# Patient Record
Sex: MALE | Race: OTHER | HISPANIC OR LATINO | URBAN - METROPOLITAN AREA
[De-identification: names, ages, dates, MRNs, and addresses within clinical notes are randomized per-mention and may not be internally consistent; named-entity substitution may affect disease eponyms.]

---

## 2024-07-29 ENCOUNTER — INPATIENT (INPATIENT)
Facility: HOSPITAL | Age: 39
LOS: 1 days | Discharge: HOME | DRG: 69 | End: 2024-07-31
Attending: STUDENT IN AN ORGANIZED HEALTH CARE EDUCATION/TRAINING PROGRAM | Admitting: STUDENT IN AN ORGANIZED HEALTH CARE EDUCATION/TRAINING PROGRAM
Payer: MEDICAID

## 2024-07-29 VITALS
OXYGEN SATURATION: 99 % | WEIGHT: 175.27 LBS | RESPIRATION RATE: 16 BRPM | SYSTOLIC BLOOD PRESSURE: 126 MMHG | HEIGHT: 69 IN | HEART RATE: 58 BPM | TEMPERATURE: 98 F | DIASTOLIC BLOOD PRESSURE: 81 MMHG

## 2024-07-29 DIAGNOSIS — Z29.9 ENCOUNTER FOR PROPHYLACTIC MEASURES, UNSPECIFIED: ICD-10-CM

## 2024-07-29 DIAGNOSIS — Z86.73 PERSONAL HISTORY OF TRANSIENT ISCHEMIC ATTACK (TIA), AND CEREBRAL INFARCTION WITHOUT RESIDUAL DEFICITS: ICD-10-CM

## 2024-07-29 DIAGNOSIS — G45.9 TRANSIENT CEREBRAL ISCHEMIC ATTACK, UNSPECIFIED: ICD-10-CM

## 2024-07-29 DIAGNOSIS — Z85.841 PERSONAL HISTORY OF MALIGNANT NEOPLASM OF BRAIN: ICD-10-CM

## 2024-07-29 DIAGNOSIS — Z98.890 OTHER SPECIFIED POSTPROCEDURAL STATES: Chronic | ICD-10-CM

## 2024-07-29 LAB
ALBUMIN SERPL ELPH-MCNC: 3.7 G/DL — SIGNIFICANT CHANGE UP (ref 3.5–5)
ALP SERPL-CCNC: 55 U/L — SIGNIFICANT CHANGE UP (ref 40–120)
ALT FLD-CCNC: 12 U/L DA — SIGNIFICANT CHANGE UP (ref 10–60)
ANION GAP SERPL CALC-SCNC: 5 MMOL/L — SIGNIFICANT CHANGE UP (ref 5–17)
APTT BLD: 32.5 SEC — SIGNIFICANT CHANGE UP (ref 24.5–35.6)
AST SERPL-CCNC: 15 U/L — SIGNIFICANT CHANGE UP (ref 10–40)
BASOPHILS # BLD AUTO: 0.05 K/UL — SIGNIFICANT CHANGE UP (ref 0–0.2)
BASOPHILS NFR BLD AUTO: 0.6 % — SIGNIFICANT CHANGE UP (ref 0–2)
BILIRUB SERPL-MCNC: 0.6 MG/DL — SIGNIFICANT CHANGE UP (ref 0.2–1.2)
BUN SERPL-MCNC: 4 MG/DL — LOW (ref 7–18)
CALCIUM SERPL-MCNC: 8.9 MG/DL — SIGNIFICANT CHANGE UP (ref 8.4–10.5)
CHLORIDE SERPL-SCNC: 105 MMOL/L — SIGNIFICANT CHANGE UP (ref 96–108)
CO2 SERPL-SCNC: 27 MMOL/L — SIGNIFICANT CHANGE UP (ref 22–31)
CREAT SERPL-MCNC: 0.75 MG/DL — SIGNIFICANT CHANGE UP (ref 0.5–1.3)
EGFR: 117 ML/MIN/1.73M2 — SIGNIFICANT CHANGE UP
EOSINOPHIL # BLD AUTO: 0.19 K/UL — SIGNIFICANT CHANGE UP (ref 0–0.5)
EOSINOPHIL NFR BLD AUTO: 2.2 % — SIGNIFICANT CHANGE UP (ref 0–6)
GLUCOSE SERPL-MCNC: 89 MG/DL — SIGNIFICANT CHANGE UP (ref 70–99)
HCT VFR BLD CALC: 40.4 % — SIGNIFICANT CHANGE UP (ref 39–50)
HGB BLD-MCNC: 13.8 G/DL — SIGNIFICANT CHANGE UP (ref 13–17)
IMM GRANULOCYTES NFR BLD AUTO: 0.2 % — SIGNIFICANT CHANGE UP (ref 0–0.9)
INR BLD: 0.93 RATIO — SIGNIFICANT CHANGE UP (ref 0.85–1.18)
LYMPHOCYTES # BLD AUTO: 2.78 K/UL — SIGNIFICANT CHANGE UP (ref 1–3.3)
LYMPHOCYTES # BLD AUTO: 32.3 % — SIGNIFICANT CHANGE UP (ref 13–44)
MCHC RBC-ENTMCNC: 34.2 GM/DL — SIGNIFICANT CHANGE UP (ref 32–36)
MCHC RBC-ENTMCNC: 35.9 PG — HIGH (ref 27–34)
MCV RBC AUTO: 105.2 FL — HIGH (ref 80–100)
MONOCYTES # BLD AUTO: 0.8 K/UL — SIGNIFICANT CHANGE UP (ref 0–0.9)
MONOCYTES NFR BLD AUTO: 9.3 % — SIGNIFICANT CHANGE UP (ref 2–14)
NEUTROPHILS # BLD AUTO: 4.77 K/UL — SIGNIFICANT CHANGE UP (ref 1.8–7.4)
NEUTROPHILS NFR BLD AUTO: 55.4 % — SIGNIFICANT CHANGE UP (ref 43–77)
NRBC # BLD: 0 /100 WBCS — SIGNIFICANT CHANGE UP (ref 0–0)
PLATELET # BLD AUTO: 305 K/UL — SIGNIFICANT CHANGE UP (ref 150–400)
POTASSIUM SERPL-MCNC: 3.8 MMOL/L — SIGNIFICANT CHANGE UP (ref 3.5–5.3)
POTASSIUM SERPL-SCNC: 3.8 MMOL/L — SIGNIFICANT CHANGE UP (ref 3.5–5.3)
PROT SERPL-MCNC: 7.3 G/DL — SIGNIFICANT CHANGE UP (ref 6–8.3)
PROTHROM AB SERPL-ACNC: 10.6 SEC — SIGNIFICANT CHANGE UP (ref 9.5–13)
RBC # BLD: 3.84 M/UL — LOW (ref 4.2–5.8)
RBC # FLD: 13.4 % — SIGNIFICANT CHANGE UP (ref 10.3–14.5)
SODIUM SERPL-SCNC: 137 MMOL/L — SIGNIFICANT CHANGE UP (ref 135–145)
TROPONIN I, HIGH SENSITIVITY RESULT: 11.1 NG/L — SIGNIFICANT CHANGE UP
WBC # BLD: 8.61 K/UL — SIGNIFICANT CHANGE UP (ref 3.8–10.5)
WBC # FLD AUTO: 8.61 K/UL — SIGNIFICANT CHANGE UP (ref 3.8–10.5)

## 2024-07-29 PROCEDURE — 0042T: CPT | Mod: MC

## 2024-07-29 PROCEDURE — 93010 ELECTROCARDIOGRAM REPORT: CPT

## 2024-07-29 PROCEDURE — 71045 X-RAY EXAM CHEST 1 VIEW: CPT | Mod: 26

## 2024-07-29 PROCEDURE — 99223 1ST HOSP IP/OBS HIGH 75: CPT | Mod: GC

## 2024-07-29 PROCEDURE — 70498 CT ANGIOGRAPHY NECK: CPT | Mod: 26,MC

## 2024-07-29 PROCEDURE — 70496 CT ANGIOGRAPHY HEAD: CPT | Mod: 26,MC

## 2024-07-29 PROCEDURE — 99291 CRITICAL CARE FIRST HOUR: CPT

## 2024-07-29 RX ORDER — ASPIRIN 500 MG
325 TABLET ORAL ONCE
Refills: 0 | Status: COMPLETED | OUTPATIENT
Start: 2024-07-29 | End: 2024-07-29

## 2024-07-29 RX ORDER — ATORVASTATIN CALCIUM 40 MG/1
80 TABLET, FILM COATED ORAL AT BEDTIME
Refills: 0 | Status: DISCONTINUED | OUTPATIENT
Start: 2024-07-29 | End: 2024-07-31

## 2024-07-29 RX ORDER — CLOPIDOGREL BISULFATE 75 MG/1
75 TABLET, FILM COATED ORAL DAILY
Refills: 0 | Status: DISCONTINUED | OUTPATIENT
Start: 2024-07-30 | End: 2024-07-30

## 2024-07-29 RX ORDER — ENOXAPARIN SODIUM 120 MG/.8ML
40 INJECTION SUBCUTANEOUS EVERY 24 HOURS
Refills: 0 | Status: DISCONTINUED | OUTPATIENT
Start: 2024-07-29 | End: 2024-07-31

## 2024-07-29 RX ORDER — ASPIRIN 500 MG
81 TABLET ORAL DAILY
Refills: 0 | Status: DISCONTINUED | OUTPATIENT
Start: 2024-07-30 | End: 2024-07-31

## 2024-07-29 RX ADMIN — Medication 325 MILLIGRAM(S): at 17:43

## 2024-07-29 RX ADMIN — ENOXAPARIN SODIUM 40 MILLIGRAM(S): 120 INJECTION SUBCUTANEOUS at 21:46

## 2024-07-29 RX ADMIN — ATORVASTATIN CALCIUM 80 MILLIGRAM(S): 40 TABLET, FILM COATED ORAL at 21:46

## 2024-07-29 NOTE — ED PROVIDER NOTE - OBJECTIVE STATEMENT
39-year-old presenting with right-sided numbness difficulty with speech approximately 20-30 minutes prior to arrival history of brain CA no nausea vomiting trauma fall

## 2024-07-29 NOTE — H&P ADULT - ATTENDING COMMENTS
ID [id number]   39M with pmh of ?brain cancer (s/p surgery ~21y ago, with residual R sided weakness), who presented transient dysarthria and R sided weakness. He notes he had come back from an appointment regarding treatment of a keloid scar on his chest, and then came to his sister’s home around 2pm. She noticed that he sat down to eat and during that time, it was noticed he had R sided arm weakness/drooping that he was unable to lift the arm on its own. Note at baseline he can lift the arm on his own against gravity. She also noticed he was slurring his words and stopping between words. All this happened for about 45 minutes before coming back to his baseline. She also had take his BP during this time, she notes his pressure was 156/72,  at that time. No associated vision changes, dizziness, lightheadedness, dyspnea, chest pain. No recent illness, viral symptoms, sick contacts. Denies any other pmh, not on any medications, but notes Fhx of HTN in parents. He denies hx of smoking, etoh, drug use. Now both the patient and his sister at bedside note that he is back to his baseline. Never had a prior similar episode before.      Vitals, labs and imaging reviewed. On arrival noted afebrile, HR 58, /81, 99% room air. WBC wnl, coags wnl, bmp wnl. Trop negative. CXR – no acute findings.    CTH - There is mild diffuse parenchymal volume loss. There is no acute intracranial hemorrhage, or midline shift. There is no acute territorial infarct. There is no hydrocephalus. Partial empty sella. Left frontal alex hole noted. Encephalomalacia/gliosis along the prior ventricular tract noted. Nonspecific hypodensity noted at the region of left medial thalamus/left lateral third ventricle. Retention cyst/polyp right maxillary sinus   CTA H/N -The common carotid and internal carotid arteries are patent. The extracranial vertebral arteries are patent. The proximal anterior, middle and posterior cerebral arteries are patent. The intracranial vertebral and basilar arteries are patent. There is no intracranial aneurysm.   PE – NAD, AOx3, no dysarthria, no facial asymmetry noted, RRR, Lungs CTA b/l, abd soft ntnd, nl bowel sounds, no LE edema, some dysmetria noted on R sided FNF test. Strabismus noted on L eye. No drift noted. RUE strength noted 4/5 compared to 5/5 on LUE. B/l LE noted symmetric in strength and sensations        A/P   39M with pmh of ?brain cancer (s/p surgery ~21y ago, with residual R sided weakness), who presented transient dysarthria and R sided weakness, admitted for TIA workup.   #Transient R sided weakness   #Transient dysarthria   #TIA w/u   #Hx brain surgery w residual R sided weakness      -start on asa/plavix/statin   -neuro consult   -discuss with neuro re MRI   -neuro checks q4   -monitor tele for events   -TTE with bubble   -tsh, a1c, lipid panel   -bedside dysphagia screen   -PT eval   -fall precautions  -dvt ppx

## 2024-07-29 NOTE — H&P ADULT - ASSESSMENT
38 yo M with PMH of  Brain CA  s/p resection 21 yrs ago with residual RT sided weakness, present with RT sided weakness and slurred speech ( lasted 45 mins)  which started today at approx 3pm. Admitted to Telemetry for TIA

## 2024-07-29 NOTE — H&P ADULT - NSHPPHYSICALEXAM_GEN_ALL_CORE
GENERAL: NAD, lying in bed comfortably   HEAD:  Atraumatic, Normocephalic  EYES: clear conjunctiva and  sclera   ENT: Moist mucous membranes  NECK: Supple  LUNG: Clear to auscultation bilaterally. No rales, wheezing,   HEART: Regular rate and rhythm; No murmurs,   ABDOMEN: Bowel sounds present; Soft, Nontender, Nondistended, Negative Rovsing's sign   EXTREMITIES:  2+ Peripheral Pulses, . No clubbing, cyanosis, or edema  NERVOUS SYSTEM:  AAOX3, speech clear. CN ll - Xll intact , 4/5 strength in RUE, 5/5 strength in RLE, 5/5 Strength in LUE & LLE, sensation intact, No pronator drift, finger to nose test intact   SKIN: No rashes or lesions

## 2024-07-29 NOTE — H&P ADULT - PROBLEM SELECTOR PLAN 1
p/w RT sided weakness and slurred speech that started today approx at 3pm  patient returned back to baseline while in the ED  NIHSS  0 in the ED   CXR: neg   Stroke protocol: negative for hemorrhage, stenosis, midline shift. Left frontal alex hole noted. Encephalomalacia/gliosis along the prior ventricular tract noted.  Passed dysphagia screen   - Started Aspirin 81mg, Atorvastatin 80mg QD, Clopidogrel 75mg QD  - Tele monitoring  - Vitals Q4hrs   - Neuro checks q4  - Fall risk precaution  - Aspiration risk   - Monitor BP - allow permissive htn x 24hr ( BP Goal:  <180/110)  - PT consulted   - Neuro consulted:  - f/u Lipid panel, A1c   - f/u MRI p/w RT sided weakness and slurred speech that started today approx at 3pm  patient returned back to baseline while in the ED  NIHSS  0 in the ED   CXR: neg   Stroke protocol: negative for hemorrhage, stenosis, midline shift. Left frontal alex hole noted. Encephalomalacia/gliosis along the prior ventricular tract noted.  Passed dysphagia screen   - Started Aspirin 81mg, Atorvastatin 80mg QD, Clopidogrel 75mg QD  - Tele monitoring  - Vitals Q4hrs   - Neuro checks q4  - Fall risk precaution  - Aspiration risk   - Monitor BP - allow permissive htn x 24hr ( BP Goal:  <180/110)  - PT consulted   - Neuro consulted: Dr. Reyes   - f/u Lipid panel, A1c   - f/u MRI p/w RT sided weakness and slurred speech that started today approx at 3pm  patient returned back to baseline while in the ED  NIHSS  0 in the ED   CXR: neg   Stroke protocol: negative for hemorrhage, stenosis, midline shift. Left frontal alex hole noted. Encephalomalacia/gliosis along the prior ventricular tract noted.  Passed dysphagia screen   - Started Aspirin 81mg, Atorvastatin 80mg QD, Clopidogrel 75mg QD  - Tele monitoring  - Vitals Q4hrs   - Neuro checks q4  - Fall risk precaution  - Aspiration risk   - Monitor BP - allow permissive htn x 24hr ( BP Goal:  <180/110)  - PT consulted   - Neuro consulted: Dr. Reyes   - f/u Lipid panel, A1c   - f/u MRI  - f/u TTE with bubble study

## 2024-07-29 NOTE — PATIENT PROFILE ADULT - FALL HARM RISK - HARM RISK INTERVENTIONS

## 2024-07-29 NOTE — ED ADULT NURSE NOTE - NS ED NOTE ABUSE SUSPICION NEGLECT YN
Impression: Other hereditary corneal dystrophies ; OU Plan: Discussed diagnosis with patient. 
Use AT's QID OU or more
ctm No

## 2024-07-29 NOTE — ED PROVIDER NOTE - CLINICAL SUMMARY MEDICAL DECISION MAKING FREE TEXT BOX
patient presenting for speech difficulty and right sided numbness from his prior to arrival on arrival  stroke scale 0 speech normal only noting right sided baseline deficit but otherwise able to move extremity no sensation  abnormality clinically well CT head negative for acute stroke will admit for TIA workup

## 2024-07-30 PROCEDURE — 70552 MRI BRAIN STEM W/DYE: CPT | Mod: 26

## 2024-07-30 PROCEDURE — 99233 SBSQ HOSP IP/OBS HIGH 50: CPT | Mod: GC

## 2024-07-30 PROCEDURE — 99222 1ST HOSP IP/OBS MODERATE 55: CPT

## 2024-07-30 RX ORDER — CLOPIDOGREL BISULFATE 75 MG/1
300 TABLET, FILM COATED ORAL ONCE
Refills: 0 | Status: COMPLETED | OUTPATIENT
Start: 2024-07-30 | End: 2024-07-30

## 2024-07-30 RX ORDER — CLOPIDOGREL BISULFATE 75 MG/1
75 TABLET, FILM COATED ORAL DAILY
Refills: 0 | Status: DISCONTINUED | OUTPATIENT
Start: 2024-07-31 | End: 2024-07-31

## 2024-07-30 RX ADMIN — CLOPIDOGREL BISULFATE 300 MILLIGRAM(S): 75 TABLET, FILM COATED ORAL at 14:10

## 2024-07-30 RX ADMIN — CLOPIDOGREL BISULFATE 75 MILLIGRAM(S): 75 TABLET, FILM COATED ORAL at 11:16

## 2024-07-30 RX ADMIN — ENOXAPARIN SODIUM 40 MILLIGRAM(S): 120 INJECTION SUBCUTANEOUS at 21:36

## 2024-07-30 RX ADMIN — Medication 81 MILLIGRAM(S): at 11:16

## 2024-07-30 RX ADMIN — ATORVASTATIN CALCIUM 80 MILLIGRAM(S): 40 TABLET, FILM COATED ORAL at 21:36

## 2024-07-30 NOTE — PROGRESS NOTE ADULT - ATTENDING COMMENTS
ID [id number]   39M with pmh of ?brain cancer (s/p surgery ~21y ago, with residual R sided weakness), who presented transient dysarthria and R sided weakness. He notes he had come back from an appointment regarding treatment of a keloid scar on his chest, and then came to his sister’s home around 2pm. She noticed that he sat down to eat and during that time, it was noticed he had R sided arm weakness/drooping that he was unable to lift the arm on its own. Note at baseline he can lift the arm on his own against gravity. She also noticed he was slurring his words and stopping between words. All this happened for about 45 minutes before coming back to his baseline. She also had take his BP during this time, she notes his pressure was 156/72,  at that time. No associated vision changes, dizziness, lightheadedness, dyspnea, chest pain. No recent illness, viral symptoms, sick contacts. Denies any other pmh, not on any medications, but notes Fhx of HTN in parents. He denies hx of smoking, etoh, drug use. Now both the patient and his sister at bedside note that he is back to his baseline. Never had a prior similar episode before.      Vitals, labs and imaging reviewed. On arrival noted afebrile, HR 58, /81, 99% room air. WBC wnl, coags wnl, bmp wnl. Trop negative. CXR – no acute findings.    CTH - There is mild diffuse parenchymal volume loss. There is no acute intracranial hemorrhage, or midline shift. There is no acute territorial infarct. There is no hydrocephalus. Partial empty sella. Left frontal alex hole noted. Encephalomalacia/gliosis along the prior ventricular tract noted. Nonspecific hypodensity noted at the region of left medial thalamus/left lateral third ventricle. Retention cyst/polyp right maxillary sinus   CTA H/N -The common carotid and internal carotid arteries are patent. The extracranial vertebral arteries are patent. The proximal anterior, middle and posterior cerebral arteries are patent. The intracranial vertebral and basilar arteries are patent. There is no intracranial aneurysm.   PE – NAD, AOx3, no dysarthria, no facial asymmetry noted, RRR, Lungs CTA b/l, abd soft ntnd, nl bowel sounds, no LE edema, some dysmetria noted on R sided FNF test. Strabismus noted on L eye. No drift noted. RUE strength noted 4/5 compared to 5/5 on LUE. B/l LE noted symmetric in strength and sensations        A/P   39M with pmh of ?brain cancer (s/p surgery ~21y ago, with residual R sided weakness), who presented transient dysarthria and R sided weakness, admitted for TIA workup.   #Transient R sided weakness   #Transient dysarthria   #TIA w/u   #Hx brain surgery w residual R sided weakness      -start on asa/plavix/statin   -neuro consult   -discuss with neuro re MRI   -neuro checks q4   -monitor tele for events   -TTE with bubble   -tsh, a1c, lipid panel   -bedside dysphagia screen   -PT eval   -fall precautions  -dvt ppx I have seen and evaluated the patient at the bedside. I have interviewed him with assistance of  Kierra ID # [id number].  He says he is back to baseline, has no weakness or dysarthria at time of my assessment.   He says he had his brain surgery a little over 20 years ago.     On exam, he is sitting on the edge of the bed in no acute distress having lunch . He is afebrile, vitals stable with AM /84. He is saturating 99% on room air. Lungs are clear. Neuro exam is non-focal, no right sided motor weakness. No dysarthria. Cranial nerves intact.     I have reviewed his CT Head personally, do not see any hemorrhage.     I have discussed case with Neurology Dr. Hill.     I have obtained collateral information from his sister at bedside.     Assessment and Plan:    39M with pmh of ?brain cancer (s/p surgery ~21y ago, with residual R sided weakness), who presented transient dysarthria and R sided weakness, admitted for TIA workup. Initial documentation says she has residual R sided weakness, but patient declines this on interview.     #Transient R sided weakness   #Transient dysarthria   #TIA w/u   #Hx brain surgery w residual R sided weakness      -start on asa/plavix/statin   -neuro consult, Dr. Hill  -MRI Brain with and without contrast (given past hx of malignancy)  -neuro checks q4   -monitor tele for events   -TTE with bubble   -tsh, a1c, lipid panel   -bedside dysphagia screen   -PT eval   -fall precautions  -dvt ppx

## 2024-07-30 NOTE — MEDICAL STUDENT PROGRESS NOTE(EDUCATION) - ASSESSMENT
TIA, possibly L MCA territory. ABCD2 score 4.    MRI w/ and w/o contrast  HbA1c, lipid panel  ECG  TTE with bubble study   40 y/o M with h/o brain cancer s/p radiation 21 years ago. P/w RT sided weakness and slurred speech that began at 3pm on 7/29/2024 and lasted 45 minutes. Symptoms resolved while in ED. Presumed diagnosis of TIA, possibly L MCA territory. ABCD2 score 4.   38 y/o M with h/o brain cancer s/p radiation 21 years ago. P/w RT sided weakness and slurred speech that began at 3pm on 7/29/2024 and lasted 45 minutes. Symptoms resolved while in ED. Negative CXR. There was no ischemia, hemorrhage, or significant stenosis on head CT, but there was an area of hypodensity on the L medial thalamus/lateral ventricle. Presumed diagnosis of TIA, possibly L MCA territory. ABCD2 score 4.

## 2024-07-30 NOTE — MEDICAL STUDENT PROGRESS NOTE(EDUCATION) - PLAN 1
MRI w/ and w/o contrast MRI head w/ and w/o contrast  HbA1c, lipid panel  ECG  TTE with bubble study

## 2024-07-30 NOTE — PROGRESS NOTE ADULT - SUBJECTIVE AND OBJECTIVE BOX
Medical Student Progress Note discussed with attending    PAGER #: [[phone]] TILL 5:00 PM  PLEASE CONTACT ON CALL TEAM:  - On Call Team (Please refer to Sanjeev) FROM 5:00 PM - 8:30PM  - Nightfloat Team FROM 8:30 -7:30 AM     was used. ID# [id number].    INTERVAL HPI/OVERNIGHT EVENTS:     No acute events overnight.   Neurology consult this AM- TIA, possibly L MCA territory. ABCD2 score 4. L thalamic/3rd ventricle lesion, may be the same brain tumor patient received radiation for in 2003 in [place] (per patient, had biopsy but no resection or chemo).    Patient states he is feeling well today and that his presenting symptoms of RT sided weakness and difficulty sleeping have resolved. Denies new weakness. Patient has fluent speech, is ambulating on his own, and voiding.     ---------------------------    REVIEW OF SYSTEMS:  CONSTITUTIONAL: No fever, weight loss, or fatigue  RESPIRATORY: No cough, wheezing, chills or hemoptysis; No shortness of breath  CARDIOVASCULAR: No chest pain, palpitations, dizziness, or leg swelling  GASTROINTESTINAL: No abdominal pain. No nausea, vomiting, or hematemesis; No diarrhea or constipation. No melena or hematochezia.  GENITOURINARY: No dysuria or hematuria, urinary frequency  NEUROLOGICAL: No headaches, memory loss, loss of strength, numbness, or tremors  SKIN: No itching, burning, rashes, or lesions     MEDICATIONS  (STANDING):  aspirin enteric coated 81 milliGRAM(s) Oral daily  atorvastatin 80 milliGRAM(s) Oral at bedtime  enoxaparin Injectable 40 milliGRAM(s) SubCutaneous every 24 hours    MEDICATIONS  (PRN):      Vital Signs Last 24 Hrs  T(C): 36.8 (30 Jul 2024 11:17), Max: 37.1 (29 Jul 2024 19:35)  T(F): 98.2 (30 Jul 2024 11:17), Max: 98.7 (29 Jul 2024 19:35)  HR: 60 (30 Jul 2024 11:17) (58 - 72)  BP: 127/74 (30 Jul 2024 11:17) (111/75 - 128/88)  BP(mean): 98 (29 Jul 2024 16:41) (98 - 98)  RR: 18 (30 Jul 2024 11:17) (16 - 20)  SpO2: 98% (30 Jul 2024 11:17) (98% - 100%)    Parameters below as of 30 Jul 2024 11:17  Patient On (Oxygen Delivery Method): room air        -----------------------------    PHYSICAL EXAMINATION:  GENERAL: NAD, well built  HEAD:  Atraumatic, Normocephalic  EYES:  conjunctiva and sclera clear  NECK: Supple, No JVD  CHEST/LUNG: Clear to auscultation bilaterally; No rales, rhonchi, wheezing, or rubs  HEART: Regular rate and rhythm; No murmurs, rubs, or gallops  ABDOMEN: Soft, Nontender, Nondistended; Bowel sounds present, no pain or masses on palpation  NERVOUS SYSTEM:  Alert & Oriented X3, 5/5 muscle strength in b/l upper and lower extremities, slow finger-to-nose test on right side compared to left, shoulder shrug intact, midline tongue protrusion  : voiding well  EXTREMITIES:  2+ Peripheral Pulses, No clubbing, cyanosis, or edema  SKIN: warm dry                          13.8   8.61  )-----------( 305      ( 29 Jul 2024 16:25 )             40.4     07-29    137  |  105  |  4<L>  ----------------------------<  89  3.8   |  27  |  0.75    Ca    8.9      29 Jul 2024 16:25    TPro  7.3  /  Alb  3.7  /  TBili  0.6  /  DBili  x   /  AST  15  /  ALT  12  /  AlkPhos  55  07-29    LIVER FUNCTIONS - ( 29 Jul 2024 16:25 )  Alb: 3.7 g/dL / Pro: 7.3 g/dL / ALK PHOS: 55 U/L / ALT: 12 U/L DA / AST: 15 U/L / GGT: x               PT/INR - ( 29 Jul 2024 16:25 )   PT: 10.6 sec;   INR: 0.93 ratio         PTT - ( 29 Jul 2024 16:25 )  PTT:32.5 sec    I&O's Summary    30 Jul 2024 07:01  -  30 Jul 2024 14:51  --------------------------------------------------------  IN: 430 mL / OUT: 0 mL / NET: 430 mL            CAPILLARY BLOOD GLUCOSE      RADIOLOGY & ADDITIONAL TESTS:

## 2024-07-30 NOTE — PROGRESS NOTE ADULT - ASSESSMENT
40 yo M with PMH of  Brain CA  s/p radiation 21 yrs ago with residual RT sided weakness, present with RT sided weakness and slurred speech ( lasted 45 mins)  which started today at approx 3pm. Admitted to Telemetry for TIA. Pending MRI head. 40 yo M with PMH of  Brain CA  s/p radiation 21 yrs ago with residual RT sided weakness, present with RT sided weakness and slurred speech ( lasted 45 mins)  which started yesterday at approx 3pm. Admitted to Telemetry for TIA. Pending MRI head.

## 2024-07-30 NOTE — PROGRESS NOTE ADULT - PROBLEM SELECTOR PLAN 1
p/w RT sided weakness and slurred speech that started 7/29/2023 approx at 3pm  patient returned back to baseline while in the ED  NIHSS 0 in the ED   CXR: neg   Stroke protocol: negative for hemorrhage, stenosis, midline shift. Left frontal alex hole noted. Encephalomalacia/gliosis along the prior ventricular tract noted.  Passed dysphagia screen  Neuro consult (Dr. Reyes) this AM: TIA, possibly L MCA territory. ABCD2 score 4  - Started Aspirin 81mg, Atorvastatin 80mg QD, Clopidogrel 75mg QD  - MRI w/ and w/o contrast ordered  - Tele monitoring  - Vitals Q4hrs   - Neuro checks q4  - Fall risk precaution  - Aspiration risk   - Monitor BP - allow permissive htn x 24hr ( BP Goal:  <180/110)  - PT consulted   - f/u Lipid panel, A1c   - f/u TTE with bubble study  - f/u ECG

## 2024-07-30 NOTE — MEDICAL STUDENT PROGRESS NOTE(EDUCATION) - SUBJECTIVE AND OBJECTIVE BOX
Medical Student Progress Note    PAGER #: [[phone]] TILL 5:00 PM  PLEASE CONTACT ON CALL TEAM:  - On Call Team (Please refer to Sanjeev) FROM 5:00 PM - 8:30PM  - Nightfloat Team FROM 8:30 -7:30 AM      INTERVAL HPI/OVERNIGHT EVENTS:     Patient states he is feeling better today and that his presenting symptoms of RT sided weakness and difficulty sleeping have resolved. Denies new weakness. Patient has fluent speech, is ambulating on his own, and voiding.     Neurology consult this AM- TIA, possibly L MCA territory. ABCD2 score 4. L thalamic/3rd ventricle lesion, may be the same brain tumor patient received radiation for in 2003 in [facility] [place] (per patient, had biopsy but no resection or chemo).    ---------------------------    REVIEW OF SYSTEMS:  CONSTITUTIONAL: No fever, weight loss, or fatigue  RESPIRATORY: No cough, wheezing, chills or hemoptysis; No shortness of breath  CARDIOVASCULAR: No chest pain, palpitations, dizziness, or leg swelling  GASTROINTESTINAL: No abdominal pain. No nausea, vomiting, or hematemesis; No diarrhea or constipation. No melena or hematochezia.  GENITOURINARY: No dysuria or hematuria, urinary frequency  NEUROLOGICAL: No headaches, memory loss, loss of strength, numbness, or tremors  SKIN: No itching, burning, rashes, or lesions     MEDICATIONS  (STANDING):  aspirin enteric coated 81 milliGRAM(s) Oral daily  atorvastatin 80 milliGRAM(s) Oral at bedtime  clopidogrel Tablet 75 milliGRAM(s) Oral daily  enoxaparin Injectable 40 milliGRAM(s) SubCutaneous every 24 hours    MEDICATIONS  (PRN):      Vital Signs Last 24 Hrs  T(C): 36.8 (30 Jul 2024 11:17), Max: 37.1 (29 Jul 2024 19:35)  T(F): 98.2 (30 Jul 2024 11:17), Max: 98.7 (29 Jul 2024 19:35)  HR: 60 (30 Jul 2024 11:17) (58 - 72)  BP: 127/74 (30 Jul 2024 11:17) (111/75 - 128/88)  BP(mean): 98 (29 Jul 2024 16:41) (98 - 98)  RR: 18 (30 Jul 2024 11:17) (16 - 20)  SpO2: 98% (30 Jul 2024 11:17) (98% - 100%)    Parameters below as of 30 Jul 2024 11:17  Patient On (Oxygen Delivery Method): room air        -----------------------------    PHYSICAL EXAMINATION:  GENERAL: NAD, well built  HEAD:  Atraumatic, Normocephalic  EYES:  conjunctiva and sclera clear  NECK: Supple, No JVD  CHEST/LUNG: Clear to auscultation bilaterally; No rales, rhonchi, wheezing, or rubs  HEART: Regular rate and rhythm; No murmurs, rubs, or gallops  ABDOMEN: Soft, Nontender, Nondistended; Bowel sounds present, no pain or masses on palpation  NERVOUS SYSTEM:  Alert & Oriented X3, 5/5 muscle strength in b/l upper and lower extremities, slow finger-to-nose test on right side compared to left, shoulder shrug intact, midline tongue protrusion  : voiding well  EXTREMITIES:  2+ Peripheral Pulses, No clubbing, cyanosis, or edema  SKIN: warm dry                          13.8   8.61  )-----------( 305      ( 29 Jul 2024 16:25 )             40.4     07-29    137  |  105  |  4<L>  ----------------------------<  89  3.8   |  27  |  0.75    Ca    8.9      29 Jul 2024 16:25    TPro  7.3  /  Alb  3.7  /  TBili  0.6  /  DBili  x   /  AST  15  /  ALT  12  /  AlkPhos  55  07-29    LIVER FUNCTIONS - ( 29 Jul 2024 16:25 )  Alb: 3.7 g/dL / Pro: 7.3 g/dL / ALK PHOS: 55 U/L / ALT: 12 U/L DA / AST: 15 U/L / GGT: x               PT/INR - ( 29 Jul 2024 16:25 )   PT: 10.6 sec;   INR: 0.93 ratio         PTT - ( 29 Jul 2024 16:25 )  PTT:32.5 sec    I&O's Summary    30 Jul 2024 07:01 - 30 Jul 2024 12:18  --------------------------------------------------------  IN: 430 mL / OUT: 0 mL / NET: 430 mL            CAPILLARY BLOOD GLUCOSE      RADIOLOGY & ADDITIONAL TESTS:

## 2024-07-30 NOTE — CONSULT NOTE ADULT - SUBJECTIVE AND OBJECTIVE BOX
Neurology  Consult Note  Initial    Pt and sister at bedside were interviewed in English by me.    39 M with hx of brain tumor s/p radiation in [place] (2003, s/p biopsy, no chemo or resection, no longer following with doctors for it), at baseline walks independently, presented on 7/29 for R-sided weakness and expressive aphasia/confusion lasting ~1.5 hours.  Pt was sitting at the table about to eat when he started feeling unwell, couldn't think of what he wanted to say, had difficulty moving RUE. Denies significant difficulty moving RLE but later says he needed help to walk. Denies facial droop. He was able to understand and remember what was happening around him and what others were saying. Symptoms worsened over 1 hour and improved after about 1.5 hours. He was able to stand again when EMS came.  No abnormal movements reported.  Pt returned to his baseline and feels well now.    Pt and sister deny hx of seizures, staring episodes, febrile seizures, head injury. Never had a similar episode of speech difficulty or worsened weakness.    Hx of brain tumor: Pt states he was told it was malignant and that biopsy was done. He was told the brain was "rotten" and nothing could be done, so the tumor was not resected. Had radiation for 6 weeks. Did not have resection, chemo,  shunt, or other procedures.  No other medical conditions. 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  Meds: none  SH: Lives with sister. Spends <6 months a [place] in the [place], rest of the time in [place] with family. Currently has been in the [place] ~15 days this time. Has not been able to work since brain tumor diagnosis in 2003, but is able to walk independently/without assistive device.    Exam:  Cognition: awake, alert, oriented to self, situation, follows commands and answers questions briskly  Language: fluent, comprehension intact  CN: VFF. Pupils equal, round, 2.5 mm, nonreactive. Limited left-upward gaze and upgaze. Facial sensation intact to light touch b/l V1-V3. R gaze intact. R smile weakness. Symmetric cheek puffing, tight eye closure, and eyebrow raise. No dysarthria.  Motor: 4/5 R shoulder abduction, 5/5 on L. 5/5 b/l elbow flex/ext, hip flex, foot DF/PF.   Reflexes: 2+ b/l biceps, BR. 0 b/l triceps. 1+ R patellar, 2+ L patellar. 0 b/l ankle (may not be relaxing). R plantar equivocal/possibly upgoing. L plantar down.  Sensory: symmetric light touch BUE and BLE  Gait: independent, narrow base, steppage gait with R foot (suggesting R dorsiflexion weakness)    Vitals, meds, labs, relevant imaging reviewed, notable for:  7/29 CTH: RIGHT frontal small area of encephalomalacia and craniotomy. L thalamic/3rd ventricle hypodensity, cystic lesion not excluded  CTAhn: no significant stenosis  CTP: unremarkable    Assessment:  TIA, possibly L MCA territory. ABCD2 score 4.  L thalamic/3rd ventricle lesion, may be the same brain tumor patient received radiation for in 2003 in [place] (per patient, had biopsy but no resection or chemo)    Recommendations:  -plavix 300 mg x1  -starting 7/31, plavix 75 mg daily for total 3 weeks - discontinue plavix after 8/19/24  -aspirin 81 mg daily  -atorvastatin 40 mg qhs  -lipid panel, A1c  -TTE with bubble study  -MRI brain with and without contrast  -routine EEG  -follow up with stroke neurology after discharge  -follow up with neuro-oncology after discharge (separate appointment) - Dr. Ochoa or Dr. Rosario [address] [facility] [phone] - please call to schedule appointment  -follow up with primary care after discharge    Frida Reyes MD Neurology  Consult Note  Initial    Pt and sister at bedside were interviewed in Kiswahili by me.    39 M with hx of brain tumor s/p radiation in [place] (2003, s/p biopsy, no chemo or resection, no longer following with doctors for it), at baseline walks independently, presented on 7/29 for R-sided weakness and expressive aphasia/confusion lasting ~1.5 hours.  Pt was sitting at the table about to eat when he started feeling unwell, couldn't think of what he wanted to say, had difficulty moving RUE. Denies significant difficulty moving RLE but later says he needed help to walk. Denies facial droop. He was able to understand and remember what was happening around him and what others were saying. Symptoms worsened over 1 hour and improved after about 1.5 hours. He was able to stand again when EMS came.  No abnormal movements reported.  Pt returned to his baseline and feels well now.    Pt and sister deny hx of seizures, staring episodes, febrile seizures, head injury. Never had a similar episode of speech difficulty or worsened weakness.    Hx of brain tumor: Pt states he was told it was malignant and that biopsy was done. He was told the brain was "rotten" and nothing could be done, so the tumor was not resected. Had radiation for 6 weeks. Did not have resection, chemo,  shunt, or other procedures.  No other medical conditions. 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  Meds: none  SH: Lives with sister. Spends [place]6 months a [place] in the [place], rest of the time in [place] with family. [place] ~15 [place] this time. Has not been able to work since brain tumor diagnosis in 2003, but is able to walk independently/without assistive device.    Exam:  Cognition: awake, alert, oriented to self, situation, follows commands and answers questions briskly  Language: fluent, comprehension intact  CN: VFF. Pupils equal, round, 2.5 mm, nonreactive. Limited left-upward gaze and upgaze. Facial sensation intact to light touch b/l V1-V3. R gaze intact. R smile weakness. Symmetric cheek puffing, tight eye closure, and eyebrow raise. No dysarthria.  Motor: 4/5 R shoulder abduction, 5/5 on L. 5/5 b/l elbow flex/ext, hip flex, foot DF/PF.   Reflexes: 2+ b/l biceps, BR. 0 b/l triceps. 1+ R patellar, 2+ L patellar. 0 b/l ankle (may not be relaxing). R plantar equivocal/possibly upgoing. L plantar down.  Sensory: symmetric light touch BUE and BLE  Gait: independent, narrow base, steppage gait with R foot (suggesting R dorsiflexion weakness)    Vitals, meds, labs, relevant imaging reviewed, notable for:  7/29 CTH: RIGHT frontal small area of encephalomalacia and craniotomy. L thalamic/3rd ventricle hypodensity, cystic lesion not excluded  CTAhn: no significant stenosis  CTP: unremarkable    Assessment:  TIA, possibly L MCA territory. ABCD2 score 4.  L thalamic/3rd ventricle lesion, may be the same brain tumor patient received radiation for in 2003 in [place] (per patient, had biopsy but no resection or chemo)    Recommendations:  -plavix 300 mg x1  -starting 7/31, plavix 75 mg daily for total 3 weeks - discontinue plavix after 8/19/24  -aspirin 81 mg daily  -atorvastatin 40 mg qhs  -lipid panel, A1c  -TTE with bubble study  -MRI brain with and without contrast  -routine EEG  -follow up with stroke neurology after discharge  -follow up with neuro-oncology after discharge (separate appointment) - Dr. Ochoa or Dr. Rosario [address] [facility] [phone] - please call to schedule appointment  -follow up with primary care after discharge    Pt and sister were informed of risk of bleeding with aspirin and plavix and to stop taking plavix after 3 weeks.    Frida Reyes MD

## 2024-07-31 ENCOUNTER — RESULT REVIEW (OUTPATIENT)
Age: 39
End: 2024-07-31

## 2024-07-31 ENCOUNTER — TRANSCRIPTION ENCOUNTER (OUTPATIENT)
Age: 39
End: 2024-07-31

## 2024-07-31 VITALS
RESPIRATION RATE: 18 BRPM | DIASTOLIC BLOOD PRESSURE: 80 MMHG | OXYGEN SATURATION: 100 % | HEART RATE: 62 BPM | TEMPERATURE: 97 F | SYSTOLIC BLOOD PRESSURE: 115 MMHG

## 2024-07-31 DIAGNOSIS — E78.5 HYPERLIPIDEMIA, UNSPECIFIED: ICD-10-CM

## 2024-07-31 LAB
A1C WITH ESTIMATED AVERAGE GLUCOSE RESULT: 5.3 % — SIGNIFICANT CHANGE UP (ref 4–5.6)
ALBUMIN SERPL ELPH-MCNC: 3.5 G/DL — SIGNIFICANT CHANGE UP (ref 3.5–5)
ALP SERPL-CCNC: 64 U/L — SIGNIFICANT CHANGE UP (ref 40–120)
ALT FLD-CCNC: 17 U/L DA — SIGNIFICANT CHANGE UP (ref 10–60)
ANION GAP SERPL CALC-SCNC: 5 MMOL/L — SIGNIFICANT CHANGE UP (ref 5–17)
AST SERPL-CCNC: 25 U/L — SIGNIFICANT CHANGE UP (ref 10–40)
BASOPHILS # BLD AUTO: 0.04 K/UL — SIGNIFICANT CHANGE UP (ref 0–0.2)
BASOPHILS NFR BLD AUTO: 0.6 % — SIGNIFICANT CHANGE UP (ref 0–2)
BILIRUB SERPL-MCNC: 0.9 MG/DL — SIGNIFICANT CHANGE UP (ref 0.2–1.2)
BUN SERPL-MCNC: 6 MG/DL — LOW (ref 7–18)
CALCIUM SERPL-MCNC: 9.5 MG/DL — SIGNIFICANT CHANGE UP (ref 8.4–10.5)
CHLORIDE SERPL-SCNC: 103 MMOL/L — SIGNIFICANT CHANGE UP (ref 96–108)
CHOLEST SERPL-MCNC: 224 MG/DL — HIGH
CO2 SERPL-SCNC: 29 MMOL/L — SIGNIFICANT CHANGE UP (ref 22–31)
CREAT SERPL-MCNC: 0.79 MG/DL — SIGNIFICANT CHANGE UP (ref 0.5–1.3)
EGFR: 115 ML/MIN/1.73M2 — SIGNIFICANT CHANGE UP
EOSINOPHIL # BLD AUTO: 0.3 K/UL — SIGNIFICANT CHANGE UP (ref 0–0.5)
EOSINOPHIL NFR BLD AUTO: 4.5 % — SIGNIFICANT CHANGE UP (ref 0–6)
ESTIMATED AVERAGE GLUCOSE: 105 MG/DL — SIGNIFICANT CHANGE UP (ref 68–114)
GLUCOSE SERPL-MCNC: 82 MG/DL — SIGNIFICANT CHANGE UP (ref 70–99)
HCT VFR BLD CALC: 44.1 % — SIGNIFICANT CHANGE UP (ref 39–50)
HDLC SERPL-MCNC: 39 MG/DL — LOW
HGB BLD-MCNC: 14.9 G/DL — SIGNIFICANT CHANGE UP (ref 13–17)
IMM GRANULOCYTES NFR BLD AUTO: 0.4 % — SIGNIFICANT CHANGE UP (ref 0–0.9)
LIPID PNL WITH DIRECT LDL SERPL: 138 MG/DL — HIGH
LYMPHOCYTES # BLD AUTO: 2.25 K/UL — SIGNIFICANT CHANGE UP (ref 1–3.3)
LYMPHOCYTES # BLD AUTO: 33.6 % — SIGNIFICANT CHANGE UP (ref 13–44)
MAGNESIUM SERPL-MCNC: 2.2 MG/DL — SIGNIFICANT CHANGE UP (ref 1.6–2.6)
MCHC RBC-ENTMCNC: 33.8 GM/DL — SIGNIFICANT CHANGE UP (ref 32–36)
MCHC RBC-ENTMCNC: 35.3 PG — HIGH (ref 27–34)
MCV RBC AUTO: 104.5 FL — HIGH (ref 80–100)
MONOCYTES # BLD AUTO: 0.54 K/UL — SIGNIFICANT CHANGE UP (ref 0–0.9)
MONOCYTES NFR BLD AUTO: 8.1 % — SIGNIFICANT CHANGE UP (ref 2–14)
NEUTROPHILS # BLD AUTO: 3.53 K/UL — SIGNIFICANT CHANGE UP (ref 1.8–7.4)
NEUTROPHILS NFR BLD AUTO: 52.8 % — SIGNIFICANT CHANGE UP (ref 43–77)
NON HDL CHOLESTEROL: 185 MG/DL — HIGH
NRBC # BLD: 0 /100 WBCS — SIGNIFICANT CHANGE UP (ref 0–0)
PHOSPHATE SERPL-MCNC: 3.7 MG/DL — SIGNIFICANT CHANGE UP (ref 2.5–4.5)
PLATELET # BLD AUTO: 292 K/UL — SIGNIFICANT CHANGE UP (ref 150–400)
POTASSIUM SERPL-MCNC: 4 MMOL/L — SIGNIFICANT CHANGE UP (ref 3.5–5.3)
POTASSIUM SERPL-SCNC: 4 MMOL/L — SIGNIFICANT CHANGE UP (ref 3.5–5.3)
PROT SERPL-MCNC: 7.2 G/DL — SIGNIFICANT CHANGE UP (ref 6–8.3)
RBC # BLD: 4.22 M/UL — SIGNIFICANT CHANGE UP (ref 4.2–5.8)
RBC # FLD: 13.2 % — SIGNIFICANT CHANGE UP (ref 10.3–14.5)
SODIUM SERPL-SCNC: 137 MMOL/L — SIGNIFICANT CHANGE UP (ref 135–145)
TRIGL SERPL-MCNC: 233 MG/DL — HIGH
WBC # BLD: 6.69 K/UL — SIGNIFICANT CHANGE UP (ref 3.8–10.5)
WBC # FLD AUTO: 6.69 K/UL — SIGNIFICANT CHANGE UP (ref 3.8–10.5)

## 2024-07-31 PROCEDURE — 85025 COMPLETE CBC W/AUTO DIFF WBC: CPT

## 2024-07-31 PROCEDURE — 80061 LIPID PANEL: CPT

## 2024-07-31 PROCEDURE — A9585: CPT

## 2024-07-31 PROCEDURE — 70498 CT ANGIOGRAPHY NECK: CPT | Mod: MC

## 2024-07-31 PROCEDURE — 82962 GLUCOSE BLOOD TEST: CPT

## 2024-07-31 PROCEDURE — 70552 MRI BRAIN STEM W/DYE: CPT | Mod: MC

## 2024-07-31 PROCEDURE — 83036 HEMOGLOBIN GLYCOSYLATED A1C: CPT

## 2024-07-31 PROCEDURE — 80053 COMPREHEN METABOLIC PANEL: CPT

## 2024-07-31 PROCEDURE — 0042T: CPT | Mod: MC

## 2024-07-31 PROCEDURE — 70450 CT HEAD/BRAIN W/O DYE: CPT | Mod: MC

## 2024-07-31 PROCEDURE — 70496 CT ANGIOGRAPHY HEAD: CPT | Mod: MC

## 2024-07-31 PROCEDURE — 99285 EMERGENCY DEPT VISIT HI MDM: CPT

## 2024-07-31 PROCEDURE — 95816 EEG AWAKE AND DROWSY: CPT | Mod: 26

## 2024-07-31 PROCEDURE — 85730 THROMBOPLASTIN TIME PARTIAL: CPT

## 2024-07-31 PROCEDURE — T1013: CPT

## 2024-07-31 PROCEDURE — 95816 EEG AWAKE AND DROWSY: CPT

## 2024-07-31 PROCEDURE — 85610 PROTHROMBIN TIME: CPT

## 2024-07-31 PROCEDURE — 83735 ASSAY OF MAGNESIUM: CPT

## 2024-07-31 PROCEDURE — 99239 HOSP IP/OBS DSCHRG MGMT >30: CPT | Mod: GC

## 2024-07-31 PROCEDURE — 71045 X-RAY EXAM CHEST 1 VIEW: CPT

## 2024-07-31 PROCEDURE — 84484 ASSAY OF TROPONIN QUANT: CPT

## 2024-07-31 PROCEDURE — 95957 EEG DIGITAL ANALYSIS: CPT

## 2024-07-31 PROCEDURE — 93306 TTE W/DOPPLER COMPLETE: CPT

## 2024-07-31 PROCEDURE — 36415 COLL VENOUS BLD VENIPUNCTURE: CPT

## 2024-07-31 PROCEDURE — 84100 ASSAY OF PHOSPHORUS: CPT

## 2024-07-31 PROCEDURE — 93005 ELECTROCARDIOGRAM TRACING: CPT

## 2024-07-31 RX ORDER — CLOPIDOGREL BISULFATE 75 MG/1
1 TABLET, FILM COATED ORAL
Qty: 19 | Refills: 0
Start: 2024-07-31 | End: 2024-08-18

## 2024-07-31 RX ORDER — ASPIRIN 500 MG
1 TABLET ORAL
Qty: 30 | Refills: 0
Start: 2024-07-31 | End: 2024-08-29

## 2024-07-31 RX ORDER — ATORVASTATIN CALCIUM 40 MG/1
1 TABLET, FILM COATED ORAL
Qty: 30 | Refills: 0
Start: 2024-07-31 | End: 2024-08-29

## 2024-07-31 RX ADMIN — CLOPIDOGREL BISULFATE 75 MILLIGRAM(S): 75 TABLET, FILM COATED ORAL at 12:31

## 2024-07-31 RX ADMIN — Medication 81 MILLIGRAM(S): at 12:31

## 2024-07-31 NOTE — DISCHARGE NOTE PROVIDER - NSDCCAREPROVSEEN_GEN_ALL_CORE_FT
Frida Reyes, Lucien Westbrook, Waldo Portillo, Frida Hobson, Alexandrea Shelby, Cholo Najera, Zia Callahan, Annita

## 2024-07-31 NOTE — PROGRESS NOTE ADULT - PROBLEM SELECTOR PLAN 2
Hx of Brain CA s/p radiation 21 yrs ago with residual RT sided weakness
Hx of Brain CA s/p radiation 21 yrs ago with residual RT sided weakness

## 2024-07-31 NOTE — PROGRESS NOTE ADULT - ASSESSMENT
38 yo M with PMH of  Brain CA  s/p radiation 21 yrs ago with residual RT sided weakness, present with RT sided weakness and slurred speech ( lasted 45 mins)  which started yesterday at approx 3pm. Admitted to Telemetry for TIA. Pending MRI head. 40 yo M with PMH of  Brain CA  s/p radiation 21 yrs ago with residual RT sided weakness, present with RT sided weakness and slurred speech ( lasted 45 mins)  which started yesterday at approx 3pm. Admitted to Telemetry for TIA. MRI head did not show acute infarction. 40 yo M with PMH of  Brain CA  s/p radiation 21 yrs ago with residual RT sided weakness, Presents with RT sided weakness and slurred speech (lasted 45 mins)  which started on 7/29 at approx 3pm. Admitted to Telemetry for TIA. MRI head on 7/30 did not show acute infarction.

## 2024-07-31 NOTE — DISCHARGE NOTE PROVIDER - HOSPITAL COURSE
38 yo M with PMH of  Brain CA  s/p biopsy with radiation 21 yrs ago with residual RT sided weakness, presented with RT sided weakness and slurred speech ( lasted 45 mins)  which started 1 day PTA at approx 3pm. CXR done and was negative. Stroke protocol: Stroke protocol: negative for hemorrhage, stenosis, midline shift. Left frontal alex hole noted. Encephalomalacia/gliosis along the prior ventricular tract noted. Admitted to Telemetry for TIA.    Neurology consulted. Patient treated with aspirin, atorvastatin, plavix. Lipid profile was: cholesterol 224, , HDL 39, non-, . MRI brain showed no acute infarct and showed (Chronic right frontal lobe cortical infarct on image 25 of series 6. Chronic right caudate head lacunar infarction. 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 There were no epileptiform abnormalities recorded.     38 yo M with PMH of  Brain CA  s/p biopsy with radiation 21 yrs ago with residual RT sided weakness, presented with RT sided weakness and slurred speech ( lasted 45 mins)  which started 1 day PTA at approx 3pm. CXR done and was negative. Stroke protocol: Stroke protocol: negative for hemorrhage, stenosis, midline shift. Left frontal alex hole noted. Encephalomalacia/gliosis along the prior ventricular tract noted. Admitted to Telemetry for TIA.    Neurology consulted. Patient treated with aspirin, atorvastatin, plavix. Lipid profile was: cholesterol 224, , HDL 39, non-, . MRI brain showed no acute infarct and showed (Chronic right frontal lobe cortical infarct on image 25 of series 6. Chronic right caudate head lacunar infarction. 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 There were no epileptiform abnormalities recorded.      Patient is able to ambulate and tolerate diet prior to discharge. Patient is stable for discharge per attending and is advised to follow up with PCP as outpatient. Please refer to patient's complete medical chart with documents for a full hospital course, for this is only a brief summary.   38 yo M with PMH of  Brain CA  s/p biopsy with radiation 21 yrs ago with residual RT sided weakness, presented with RT sided weakness and slurred speech ( lasted 45 mins)  which started 1 day PTA at approx 3pm. CXR done and was negative. Stroke protocol: Stroke protocol: negative for hemorrhage, stenosis, midline shift. Left frontal alex hole noted. Encephalomalacia/gliosis along the prior ventricular tract noted. Admitted to Telemetry for TIA.    Neurology consulted. Patient treated with aspirin, atorvastatin, plavix. Lipid profile was: cholesterol 224, , HDL 39, non-, . MRI brain showed no acute infarct and showed (Chronic right frontal lobe cortical infarct on image 25 of series 6. Chronic right caudate head lacunar infarction. Nonenhancing cyst in the region of the   left thalamus extending into the midbrain measuring 1.4 x 1.2 x 1.4 cm suggesting a neuroepithelial cyst.)    TTE with bubble study showed LVEF 67%, pulmonary arteriovenous malformation.    EEG showed Focal cerebral dysfunction in the left frontotemporal region. There were no epileptiform abnormalities recorded.      Patient is able to ambulate and tolerate diet prior to discharge. Patient is stable for discharge per attending and is advised to follow up with PCP as outpatient. Please refer to patient's complete medical chart with documents for a full hospital course, for this is only a brief summary.   38 yo M with PMH of  Brain CA  s/p biopsy with radiation 21 yrs ago with residual RT sided weakness, presented with RT sided weakness and slurred speech ( lasted 45 mins)  which started 1 day PTA at approx 3pm. CXR done and was negative. Stroke protocol: Stroke protocol: negative for hemorrhage, stenosis, midline shift. Left frontal alex hole noted. Encephalomalacia/gliosis along the prior ventricular tract noted. Admitted to Telemetry for TIA.    Neurology consulted. Patient treated with aspirin, atorvastatin, plavix. Lipid profile was: cholesterol 224, , HDL 39, non-, . MRI brain showed no acute infarct and showed (Chronic right frontal lobe cortical infarct on image 25 of series 6. Chronic right caudate head lacunar infarction. 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 Patient to follow up with Dr. Essence Richey (pulmonary) upon discharge.    EEG showed Focal cerebral dysfunction in the left frontotemporal region. There were no epileptiform abnormalities recorded.      Patient is able to ambulate and tolerate diet prior to discharge. Patient is stable for discharge per attending and is advised to follow up with PCP as outpatient. Please refer to patient's complete medical chart with documents for a full hospital course, for this is only a brief summary.   40 yo M with PMH of  Brain CA  s/p biopsy with radiation 21 yrs ago with residual RT sided weakness, presented with RT sided weakness and slurred speech ( lasted 45 mins)  which started 1 day PTA at approx 3pm. CXR done and was negative. Stroke protocol: Stroke protocol: negative for hemorrhage, stenosis, midline shift. Left frontal alex hole noted. Encephalomalacia/gliosis along the prior ventricular tract noted. Admitted to Telemetry for TIA.    Neurology consulted. Patient treated with aspirin, atorvastatin, plavix. Lipid profile was: cholesterol 224, , HDL 39, non-, . MRI brain showed no acute infarct and showed (Chronic right frontal lobe cortical infarct on image 25 of series 6. Chronic right caudate head lacunar infarction. 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 Patient to follow up with Dr. Essence Richey (pulmonary) upon discharge.    EEG showed Focal cerebral dysfunction in the left frontotemporal region. There were no epileptiform abnormalities recorded.      Patient is able to ambulate and tolerate diet prior to discharge. Patient is stable for discharge per attending and is advised to follow up with PCP as outpatient. Please refer to patient's complete medical chart with documents for a full hospital course, for this is only a brief summary.    Attending Attestation:     Agree with the above. Patient was admitted for transient dysarthria and right upper extremity motor weakness which resolved in < 1 hour, consistent with TIA. His brain MRI did not show acute infarction or any other abnormalities, notably no recurrence of mass or malignancy given his history of brain tumor biopsy and radiation. EEG was negative for seizures, did show focal slowing in left frontotemporal region.     TTE with bubble study demonstrated evidence of a pulmonary arteriovenous malformation for which patient will follow up with pulmonary medicine. It is unclear if it is linked to presentation or if would require procedure (e.g. ablation) by interventional pulmonology/radiology.     Patient's LDL was > 100, started on high intensity statin. He will need to have lipid profile rechecked in a few weeks at PCP follow up.     Patient will follow with neurology and neuro-oncology.

## 2024-07-31 NOTE — DISCHARGE NOTE PROVIDER - NSDCMRMEDTOKEN_GEN_ALL_CORE_FT
aspirin 81 mg oral delayed release tablet: 1 tab(s) orally once a day  atorvastatin 80 mg oral tablet: 1 tab(s) orally once a day (at bedtime)  clopidogrel 75 mg oral tablet: 1 tab(s) orally once a day Please take medication until August 19th, 2024 to complete a total course of 21 days

## 2024-07-31 NOTE — DISCHARGE NOTE PROVIDER - ATTENDING DISCHARGE PHYSICAL EXAM:
----- Message from Lola Wenceslao sent at 7/6/2022  2:35 PM EDT -----  Subject: Referral Request    Reason for referral request? Pt wants full lab orders completed prior to   11/4 physical appt; please call pt when orders have been written - prior   labs completed on 11/3/2021  Provider patient wants to be referred to(if known):     Provider Phone Number(if known):     Additional Information for Provider?   ---------------------------------------------------------------------------  --------------  [id number]    [phone]; OK to leave message on voicemail  ---------------------------------------------------------------------------  -------------- Attending Discharge Physical Examination:

## 2024-07-31 NOTE — PROGRESS NOTE ADULT - ATTENDING COMMENTS
I have seen and evaluated the patient at the bedside. I have interviewed him with assistance of  Kierra ID # [id number].  He says he is back to baseline, has no weakness or dysarthria at time of my assessment.   He says he had his brain surgery a little over 20 years ago.     On exam, he is sitting on the edge of the bed in no acute distress having lunch . He is afebrile, vitals stable with AM /84. He is saturating 99% on room air. Lungs are clear. Neuro exam is non-focal, no right sided motor weakness. No dysarthria. Cranial nerves intact.     I have reviewed his CT Head personally, do not see any hemorrhage.     I have discussed case with Neurology Dr. Hill.     I have obtained collateral information from his sister at bedside.     Assessment and Plan:    39M with pmh of ?brain cancer (s/p surgery ~21y ago, with residual R sided weakness), who presented transient dysarthria and R sided weakness, admitted for TIA workup. Initial documentation says she has residual R sided weakness, but patient declines this on interview.     #Transient R sided weakness   #Transient dysarthria   #TIA w/u   #Hx brain surgery w residual R sided weakness      -start on asa/plavix/statin   -neuro consult, Dr. Hill  -MRI Brain with and without contrast (given past hx of malignancy)  -neuro checks q4   -monitor tele for events   -TTE with bubble   -tsh, a1c, lipid panel   -bedside dysphagia screen   -PT eval   -fall precautions  -dvt ppx I have seen and evaluated the patient at the bedside. I have interviewed him with assistance of  Kierra ID # [id number].  He says he is back to baseline, has no weakness or dysarthria at time of my assessment.   He says he had his brain surgery a little over 20 years ago.     On exam, he is sitting on the edge of the bed in no acute distress having lunch . He is afebrile, vitals stable with AM /84. He is saturating 99% on room air. Lungs are clear. Neuro exam is non-focal, no right sided motor weakness. No dysarthria. Cranial nerves intact.     I have reviewed his CT Head personally, do not see any hemorrhage.     I have discussed case with Neurology Dr. Hill.     I have obtained collateral information from his sister at bedside.     Assessment and Plan:    39M with pmh of ?brain cancer (s/p surgery ~21y ago, with residual R sided weakness), who presented transient dysarthria and R sided weakness, admitted for TIA workup. Initial documentation says she has residual R sided weakness, but patient declines this on interview.     #Transient R sided weakness   #Transient dysarthria   #TIA w/u   #Hx brain surgery w residual R sided weakness      -DAPT for 21 days  -neuro consult, Dr. Hill  -MRI Brain with and without contrast (given past hx of malignancy) -> negative for infarction or mass  -ok to DC scheduled neuro checks  -monitor tele for events, no arrhythmia   -TTE with bubble -> identified pulmonary FRANCESCA  -LDL > 130, now on high intensity statin, will need to recheck outpatient   -bedside dysphagia screen passed  -PT eval not needed  -fall precautions  -dvt ppx

## 2024-07-31 NOTE — DISCHARGE NOTE PROVIDER - NSDCCPCAREPLAN_GEN_ALL_CORE_FT
PRINCIPAL DISCHARGE DIAGNOSIS  Diagnosis: Transient ischemic attack (TIA)  Assessment and Plan of Treatment: You were diagnosed with transient ischemic attack (TIA) which means your brain had a temporary loss of oxygen perfusion but resolved on its own without any permanent brain damage. TIAs may present with transient neurologic symptoms such as facial droop, extremity weakness and difficulty speaking.  TIAs are often precursors to strokes and could serve as warning to prevent future stroke occurence.  CT head and MRI brain were unremarkable for any acute events. You were seen by Neurology who recommends ASPIRIN 81 MG DAILY, ATORVASTATIN 40 MG DAILY AND PLAVIX 75 MG DAILY FOR A TOTAL OF 21 days, LAST DOSE 8/19/2024. Please take medications as prescribed and follow up with your PCP/ Neurologist in a week from discharge.     PRINCIPAL DISCHARGE DIAGNOSIS  Diagnosis: Transient ischemic attack (TIA)  Assessment and Plan of Treatment: You were diagnosed with transient ischemic attack (TIA) which means your brain had a temporary loss of oxygen perfusion but resolved on its own without any permanent brain damage. TIAs may present with transient neurologic symptoms such as facial droop, extremity weakness and difficulty speaking.  TIAs are often precursors to strokes and could serve as warning to prevent future stroke occurence.  CT head and MRI brain were unremarkable for any acute events. You were seen by Neurology who recommends ASPIRIN 81 MG DAILY, ATORVASTATIN 40 MG DAILY AND PLAVIX 75 MG DAILY FOR A TOTAL OF 21 days, LAST DOSE 8/19/2024. Please take medications as prescribed and follow up with your PCP/ Neurologist in a week from discharge.      SECONDARY DISCHARGE DIAGNOSES  Diagnosis: Hyperlipidemia  Assessment and Plan of Treatment: On this admission you were found to have abnormal high lipid profile. You were started on atorvastatin.  Please take your medication as prescribed. Maintain healthy lifestyle, low fat diet, exercise regularly and check your lipid levels routinely.   Please follow up with your PCP in 1 week from discharge.    Diagnosis: Pulmonary arteriovenous malformation  Assessment and Plan of Treatment: You were found to have a pulmonary arteriovenous malformation on echocardiogram. Please call and follow up with Dr. Richey (pulmonary) in 1 week from discharge.

## 2024-07-31 NOTE — DISCHARGE NOTE PROVIDER - CARE PROVIDER_API CALL
Martita Ochoa  Please follow up with neuro-oncology after discharge (separate appointment) - Dr. Ochoa or Dr. Rosario [address] [facility] [phone] - please call to schedule appointment  Phone: [phone]  Fax: (   )    -  Follow Up Time:    Martita Ochoa  Please follow up with neuro-oncology after discharge (separate appointment) - Dr. Ochoa or Dr. Rosario [address] [facility] [phone] - please call to schedule appointment  Phone: [phone]  Fax: (   )    -  Follow Up Time:     Essence Richey  Pulmonary Disease  [address] [facility], [address]  [place] [address]  Phone: [phone]  Fax: [phone]  Follow Up Time:

## 2024-07-31 NOTE — CHART NOTE - NSCHARTNOTEFT_GEN_A_CORE
I attempted to reach PCP, received a message that said "this call may be recorded" but then never heard anything else after extended period of time.

## 2024-07-31 NOTE — DISCHARGE NOTE NURSING/CASE MANAGEMENT/SOCIAL WORK - PATIENT PORTAL LINK FT
You can access the FollowMyHealth Patient Portal offered by [facility] by registering at the following website: [email or web address]. By joining [facility]’s FollowMyHealth portal, you will also be able to view your health information using other applications (apps) compatible with our system.

## 2024-07-31 NOTE — DISCHARGE NOTE NURSING/CASE MANAGEMENT/SOCIAL WORK - NSDCPEFALRISK_GEN_ALL_CORE
For information on Fall & Injury Prevention, visit: [email or web address] OR  [email or web address] OR  https://www.cdc.gov/steadi/patient.html

## 2024-07-31 NOTE — PROGRESS NOTE ADULT - SUBJECTIVE AND OBJECTIVE BOX
PGY-1 Progress Note discussed with attending    PAGER #: [[phone]] TILL 5:00 PM  PLEASE CONTACT ON CALL TEAM:  - On Call Team (Please refer to Sanjeev) FROM 5:00 PM - 8:30PM  - Nightfloat Team FROM 8:30 -7:30 AM      INTERVAL HPI/OVERNIGHT EVENTS:     No acute events overnight.  Patient had MRI head yesterday evening, which showed    ---------------------------    REVIEW OF SYSTEMS:  CONSTITUTIONAL: No fever, weight loss, or fatigue  RESPIRATORY: No cough, wheezing, chills or hemoptysis; No shortness of breath  CARDIOVASCULAR: No chest pain, palpitations, dizziness, or leg swelling  GASTROINTESTINAL: No abdominal pain. No nausea, vomiting, or hematemesis; No diarrhea or constipation. No melena or hematochezia.  GENITOURINARY: No dysuria or hematuria, urinary frequency  NEUROLOGICAL: No headaches, memory loss, loss of strength, numbness, or tremors  SKIN: No itching, burning, rashes, or lesions     MEDICATIONS  (STANDING):  aspirin enteric coated 81 milliGRAM(s) Oral daily  atorvastatin 80 milliGRAM(s) Oral at bedtime  clopidogrel Tablet 75 milliGRAM(s) Oral daily  enoxaparin Injectable 40 milliGRAM(s) SubCutaneous every 24 hours    MEDICATIONS  (PRN):      Vital Signs Last 24 Hrs  T(C): 36.9 (31 Jul 2024 04:36), Max: 37 (30 Jul 2024 20:46)  T(F): 98.4 (31 Jul 2024 04:36), Max: 98.6 (30 Jul 2024 20:46)  HR: 57 (31 Jul 2024 04:36) (57 - 60)  BP: 124/86 (31 Jul 2024 04:36) (123/70 - 134/96)  BP(mean): --  RR: 18 (31 Jul 2024 04:36) (18 - 18)  SpO2: 100% (31 Jul 2024 04:36) (98% - 100%)    Parameters below as of 31 Jul 2024 04:36  Patient On (Oxygen Delivery Method): room air        -----------------------------    PHYSICAL EXAMINATION:  GENERAL: NAD, well built  HEAD:  Atraumatic, Normocephalic  EYES:  conjunctiva and sclera clear  NECK: Supple, No JVD  CHEST/LUNG: Clear to auscultation bilaterally; No rales, rhonchi, wheezing, or rubs  HEART: Regular rate and rhythm; No murmurs, rubs, or gallops  ABDOMEN: Soft, Nontender, Nondistended; Bowel sounds present, no pain or masses on palpation  NERVOUS SYSTEM:  Alert & Oriented X3  : voiding well  EXTREMITIES:  2+ Peripheral Pulses, No clubbing, cyanosis, or edema  SKIN: warm dry                          13.8   8.61  )-----------( 305      ( 29 Jul 2024 16:25 )             40.4     07-29    137  |  105  |  4<L>  ----------------------------<  89  3.8   |  27  |  0.75    Ca    8.9      29 Jul 2024 16:25    TPro  7.3  /  Alb  3.7  /  TBili  0.6  /  DBili  x   /  AST  15  /  ALT  12  /  AlkPhos  55  07-29    LIVER FUNCTIONS - ( 29 Jul 2024 16:25 )  Alb: 3.7 g/dL / Pro: 7.3 g/dL / ALK PHOS: 55 U/L / ALT: 12 U/L DA / AST: 15 U/L / GGT: x               PT/INR - ( 29 Jul 2024 16:25 )   PT: 10.6 sec;   INR: 0.93 ratio         PTT - ( 29 Jul 2024 16:25 )  PTT:32.5 sec    I&O's Summary    30 Jul 2024 07:01  -  31 Jul 2024 07:00  --------------------------------------------------------  IN: 430 mL / OUT: 0 mL / NET: 430 mL            CAPILLARY BLOOD GLUCOSE      RADIOLOGY & ADDITIONAL TESTS:                                   PGY-1 Progress Note discussed with attending    PAGER #: [[phone]] TILL 5:00 PM  PLEASE CONTACT ON CALL TEAM:  - On Call Team (Please refer to Sanjeev) FROM 5:00 PM - 8:30PM  - Nightfloat Team FROM 8:30 -7:30 AM      INTERVAL HPI/OVERNIGHT EVENTS:     No acute events overnight.  Patient had MRI head yesterday evening, which showed no acute infarction. 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    ---------------------------    REVIEW OF SYSTEMS:  CONSTITUTIONAL: No fever, weight loss, or fatigue  RESPIRATORY: No cough, wheezing, chills or hemoptysis; No shortness of breath  CARDIOVASCULAR: No chest pain, palpitations, dizziness, or leg swelling  GASTROINTESTINAL: No abdominal pain. No nausea, vomiting, or hematemesis; No diarrhea or constipation. No melena or hematochezia.  GENITOURINARY: No dysuria or hematuria, urinary frequency  NEUROLOGICAL: No headaches, memory loss, loss of strength, numbness, or tremors  SKIN: No itching, burning, rashes, or lesions     MEDICATIONS  (STANDING):  aspirin enteric coated 81 milliGRAM(s) Oral daily  atorvastatin 80 milliGRAM(s) Oral at bedtime  clopidogrel Tablet 75 milliGRAM(s) Oral daily  enoxaparin Injectable 40 milliGRAM(s) SubCutaneous every 24 hours    MEDICATIONS  (PRN):      Vital Signs Last 24 Hrs  T(C): 36.9 (31 Jul 2024 04:36), Max: 37 (30 Jul 2024 20:46)  T(F): 98.4 (31 Jul 2024 04:36), Max: 98.6 (30 Jul 2024 20:46)  HR: 57 (31 Jul 2024 04:36) (57 - 60)  BP: 124/86 (31 Jul 2024 04:36) (123/70 - 134/96)  BP(mean): --  RR: 18 (31 Jul 2024 04:36) (18 - 18)  SpO2: 100% (31 Jul 2024 04:36) (98% - 100%)    Parameters below as of 31 Jul 2024 04:36  Patient On (Oxygen Delivery Method): room air        -----------------------------    PHYSICAL EXAMINATION:  GENERAL: NAD, well built  HEAD:  Atraumatic, Normocephalic  EYES:  conjunctiva and sclera clear  NECK: Supple, No JVD  CHEST/LUNG: Clear to auscultation bilaterally; No rales, rhonchi, wheezing, or rubs  HEART: Regular rate and rhythm; No murmurs, rubs, or gallops  ABDOMEN: Soft, Nontender, Nondistended; Bowel sounds present, no pain or masses on palpation  NERVOUS SYSTEM:  Alert & Oriented X3  : voiding well  EXTREMITIES:  2+ Peripheral Pulses, No clubbing, cyanosis, or edema  SKIN: warm dry                          13.8   8.61  )-----------( 305      ( 29 Jul 2024 16:25 )             40.4     07-29    137  |  105  |  4<L>  ----------------------------<  89  3.8   |  27  |  0.75    Ca    8.9      29 Jul 2024 16:25    TPro  7.3  /  Alb  3.7  /  TBili  0.6  /  DBili  x   /  AST  15  /  ALT  12  /  AlkPhos  55  07-29    LIVER FUNCTIONS - ( 29 Jul 2024 16:25 )  Alb: 3.7 g/dL / Pro: 7.3 g/dL / ALK PHOS: 55 U/L / ALT: 12 U/L DA / AST: 15 U/L / GGT: x               PT/INR - ( 29 Jul 2024 16:25 )   PT: 10.6 sec;   INR: 0.93 ratio         PTT - ( 29 Jul 2024 16:25 )  PTT:32.5 sec    I&O's Summary    30 Jul 2024 07:01  -  31 Jul 2024 07:00  --------------------------------------------------------  IN: 430 mL / OUT: 0 mL / NET: 430 mL            CAPILLARY BLOOD GLUCOSE      RADIOLOGY & ADDITIONAL TESTS:                                   Medical Student Progress Note discussed with attending    PAGER #: [[phone]] TILL 5:00 PM  PLEASE CONTACT ON CALL TEAM:  - On Call Team (Please refer to Sanjeev) FROM 5:00 PM - 8:30PM  - Nightfloat Team FROM 8:30 -7:30 AM     used. ID#[id number]    INTERVAL HPI/OVERNIGHT EVENTS:     No acute events overnight.  Patient had MRI head yesterday evening, which showed no acute infarction. 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    Patient is feeling well today. Denies weakness or new symptoms. Fluent speech, eating and voiding well.     ---------------------------    REVIEW OF SYSTEMS:  CONSTITUTIONAL: No fever, weight loss, or fatigue  RESPIRATORY: No cough, wheezing, chills or hemoptysis; No shortness of breath  CARDIOVASCULAR: No chest pain, palpitations, dizziness, or leg swelling  GASTROINTESTINAL: No abdominal pain. No nausea, vomiting, or hematemesis; No diarrhea or constipation. No melena or hematochezia.  GENITOURINARY: No dysuria or hematuria, urinary frequency  NEUROLOGICAL: No headaches, memory loss, loss of strength, numbness, or tremors  SKIN: No itching, burning, rashes, or lesions     MEDICATIONS  (STANDING):  aspirin enteric coated 81 milliGRAM(s) Oral daily  atorvastatin 80 milliGRAM(s) Oral at bedtime  clopidogrel Tablet 75 milliGRAM(s) Oral daily  enoxaparin Injectable 40 milliGRAM(s) SubCutaneous every 24 hours    MEDICATIONS  (PRN):      Vital Signs Last 24 Hrs  T(C): 36.9 (31 Jul 2024 04:36), Max: 37 (30 Jul 2024 20:46)  T(F): 98.4 (31 Jul 2024 04:36), Max: 98.6 (30 Jul 2024 20:46)  HR: 57 (31 Jul 2024 04:36) (57 - 60)  BP: 124/86 (31 Jul 2024 04:36) (123/70 - 134/96)  BP(mean): --  RR: 18 (31 Jul 2024 04:36) (18 - 18)  SpO2: 100% (31 Jul 2024 04:36) (98% - 100%)    Parameters below as of 31 Jul 2024 04:36  Patient On (Oxygen Delivery Method): room air        -----------------------------    PHYSICAL EXAMINATION:  GENERAL: NAD, well built  HEAD:  Atraumatic, Normocephalic  EYES:  conjunctiva and sclera clear  NECK: Supple, No JVD  CHEST/LUNG: Clear to auscultation bilaterally; No rales, rhonchi, wheezing, or rubs  HEART: Regular rate and rhythm; No murmurs, rubs, or gallops  ABDOMEN: Soft, Nontender, Nondistended; Bowel sounds present, no pain or masses on palpation  NERVOUS SYSTEM:  Alert & Oriented X3, 4/5 muscle strength in RT upper extremity with 5/5 muscle strength in all other extremities, decreased shoulder shrug on the RT, slow finger to nose test on the RT, slow rapid alternating movements on the RT, 2+ DTR in b/l triceps and brachioradialis, extraocular movements intact b/l, equal facial movements and sensation b/l  : voiding well, midline tongue protrusion,  EXTREMITIES:  2+ Peripheral Pulses, No clubbing, cyanosis, or edema  SKIN: warm dry                          13.8   8.61  )-----------( 305      ( 29 Jul 2024 16:25 )             40.4     07-29    137  |  105  |  4<L>  ----------------------------<  89  3.8   |  27  |  0.75    Ca    8.9      29 Jul 2024 16:25    TPro  7.3  /  Alb  3.7  /  TBili  0.6  /  DBili  x   /  AST  15  /  ALT  12  /  AlkPhos  55  07-29    LIVER FUNCTIONS - ( 29 Jul 2024 16:25 )  Alb: 3.7 g/dL / Pro: 7.3 g/dL / ALK PHOS: 55 U/L / ALT: 12 U/L DA / AST: 15 U/L / GGT: x               PT/INR - ( 29 Jul 2024 16:25 )   PT: 10.6 sec;   INR: 0.93 ratio         PTT - ( 29 Jul 2024 16:25 )  PTT:32.5 sec    I&O's Summary    30 Jul 2024 07:01  -  31 Jul 2024 07:00  --------------------------------------------------------  IN: 430 mL / OUT: 0 mL / NET: 430 mL            CAPILLARY BLOOD GLUCOSE      RADIOLOGY & ADDITIONAL TESTS:                                   Medical Student Progress Note discussed with attending    PAGER #: [[phone]] TILL 5:00 PM  PLEASE CONTACT ON CALL TEAM:  - On Call Team (Please refer to Sanjeev) FROM 5:00 PM - 8:30PM  - Nightfloat Team FROM 8:30 -7:30 AM     used. ID#[id number]    INTERVAL HPI/OVERNIGHT EVENTS:     No acute events overnight.  Patient had MRI head yesterday evening, which showed no acute infarction. 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  Abnormal lipid panel this AM: Chol 224, , HFDL 39,     Patient is feeling well today. Denies weakness or new symptoms. Fluent speech, eating and voiding well.     ---------------------------    REVIEW OF SYSTEMS:  CONSTITUTIONAL: No fever, weight loss, or fatigue  RESPIRATORY: No cough, wheezing, chills or hemoptysis; No shortness of breath  CARDIOVASCULAR: No chest pain, palpitations, dizziness, or leg swelling  GASTROINTESTINAL: No abdominal pain. No nausea, vomiting, or hematemesis; No diarrhea or constipation. No melena or hematochezia.  GENITOURINARY: No dysuria or hematuria, urinary frequency  NEUROLOGICAL: No headaches, memory loss, loss of strength, numbness, or tremors  SKIN: No itching, burning, rashes, or lesions     MEDICATIONS  (STANDING):  aspirin enteric coated 81 milliGRAM(s) Oral daily  atorvastatin 80 milliGRAM(s) Oral at bedtime  clopidogrel Tablet 75 milliGRAM(s) Oral daily  enoxaparin Injectable 40 milliGRAM(s) SubCutaneous every 24 hours    MEDICATIONS  (PRN):      Vital Signs Last 24 Hrs  T(C): 36.9 (31 Jul 2024 04:36), Max: 37 (30 Jul 2024 20:46)  T(F): 98.4 (31 Jul 2024 04:36), Max: 98.6 (30 Jul 2024 20:46)  HR: 57 (31 Jul 2024 04:36) (57 - 60)  BP: 124/86 (31 Jul 2024 04:36) (123/70 - 134/96)  BP(mean): --  RR: 18 (31 Jul 2024 04:36) (18 - 18)  SpO2: 100% (31 Jul 2024 04:36) (98% - 100%)    Parameters below as of 31 Jul 2024 04:36  Patient On (Oxygen Delivery Method): room air        -----------------------------    PHYSICAL EXAMINATION:  GENERAL: NAD, well built  HEAD:  Atraumatic, Normocephalic  EYES:  conjunctiva and sclera clear  NECK: Supple, No JVD  CHEST/LUNG: Clear to auscultation bilaterally; No rales, rhonchi, wheezing, or rubs  HEART: Regular rate and rhythm; No murmurs, rubs, or gallops  ABDOMEN: Soft, Nontender, Nondistended; Bowel sounds present, no pain or masses on palpation  NERVOUS SYSTEM:  Alert & Oriented X3, 4/5 muscle strength in RT upper extremity with 5/5 muscle strength in all other extremities, decreased shoulder shrug on the RT, slow finger to nose test on the RT, slow rapid alternating movements on the RT, 2+ DTR in b/l triceps and brachioradialis, extraocular movements intact b/l, equal facial movements and sensation b/l  : voiding well, midline tongue protrusion,  EXTREMITIES:  2+ Peripheral Pulses, No clubbing, cyanosis, or edema  SKIN: warm dry                          13.8   8.61  )-----------( 305      ( 29 Jul 2024 16:25 )             40.4     07-29    137  |  105  |  4<L>  ----------------------------<  89  3.8   |  27  |  0.75    Ca    8.9      29 Jul 2024 16:25    TPro  7.3  /  Alb  3.7  /  TBili  0.6  /  DBili  x   /  AST  15  /  ALT  12  /  AlkPhos  55  07-29    LIVER FUNCTIONS - ( 29 Jul 2024 16:25 )  Alb: 3.7 g/dL / Pro: 7.3 g/dL / ALK PHOS: 55 U/L / ALT: 12 U/L DA / AST: 15 U/L / GGT: x               PT/INR - ( 29 Jul 2024 16:25 )   PT: 10.6 sec;   INR: 0.93 ratio         PTT - ( 29 Jul 2024 16:25 )  PTT:32.5 sec    I&O's Summary    30 Jul 2024 07:01  -  31 Jul 2024 07:00  --------------------------------------------------------  IN: 430 mL / OUT: 0 mL / NET: 430 mL            CAPILLARY BLOOD GLUCOSE      RADIOLOGY & ADDITIONAL TESTS:                                   Medical Student Progress Note discussed with attending    PAGER #: [[phone]] TILL 5:00 PM  PLEASE CONTACT ON CALL TEAM:  - On Call Team (Please refer to Sanjeev) FROM 5:00 PM - 8:30PM  - Nightfloat Team FROM 8:30 -7:30 AM     used. ID#[id number]    INTERVAL HPI/OVERNIGHT EVENTS:     No acute events overnight.  Patient had MRI head yesterday evening, which showed no acute infarction.  Patient is feeling well today. Denies weakness or new symptoms. Fluent speech, eating and voiding well.     ---------------------------    REVIEW OF SYSTEMS:  CONSTITUTIONAL: No fever, weight loss, or fatigue  RESPIRATORY: No cough, wheezing, chills or hemoptysis; No shortness of breath  CARDIOVASCULAR: No chest pain, palpitations, dizziness, or leg swelling  GASTROINTESTINAL: No abdominal pain. No nausea, vomiting, or hematemesis; No diarrhea or constipation. No melena or hematochezia.  GENITOURINARY: No dysuria or hematuria, urinary frequency  NEUROLOGICAL: No headaches, memory loss, loss of strength, numbness, or tremors  SKIN: No itching, burning, rashes, or lesions     MEDICATIONS  (STANDING):  aspirin enteric coated 81 milliGRAM(s) Oral daily  atorvastatin 80 milliGRAM(s) Oral at bedtime  clopidogrel Tablet 75 milliGRAM(s) Oral daily  enoxaparin Injectable 40 milliGRAM(s) SubCutaneous every 24 hours    MEDICATIONS  (PRN):      Vital Signs Last 24 Hrs  T(C): 36.9 (31 Jul 2024 04:36), Max: 37 (30 Jul 2024 20:46)  T(F): 98.4 (31 Jul 2024 04:36), Max: 98.6 (30 Jul 2024 20:46)  HR: 57 (31 Jul 2024 04:36) (57 - 60)  BP: 124/86 (31 Jul 2024 04:36) (123/70 - 134/96)  BP(mean): --  RR: 18 (31 Jul 2024 04:36) (18 - 18)  SpO2: 100% (31 Jul 2024 04:36) (98% - 100%)    Parameters below as of 31 Jul 2024 04:36  Patient On (Oxygen Delivery Method): room air        -----------------------------    PHYSICAL EXAMINATION:  GENERAL: NAD, well built  HEAD:  Atraumatic, Normocephalic  EYES:  conjunctiva and sclera clear  NECK: Supple, No JVD  CHEST/LUNG: Clear to auscultation bilaterally; No rales, rhonchi, wheezing, or rubs  HEART: Regular rate and rhythm; No murmurs, rubs, or gallops  ABDOMEN: Soft, Nontender, Nondistended; Bowel sounds present, no pain or masses on palpation  NERVOUS SYSTEM:  Alert & Oriented X3, 4/5 muscle strength in RT upper extremity with 5/5 muscle strength in all other extremities, decreased shoulder shrug on the RT, slow finger to nose test on the RT, slow rapid alternating movements on the RT, 2+ DTR in b/l triceps and brachioradialis, extraocular movements intact b/l, equal facial movements and sensation b/l, midline tongue protrusion  : voiding well  EXTREMITIES:  2+ Peripheral Pulses, No clubbing, cyanosis, or edema  SKIN: warm dry                          13.8   8.61  )-----------( 305      ( 29 Jul 2024 16:25 )             40.4     07-29    137  |  105  |  4<L>  ----------------------------<  89  3.8   |  27  |  0.75    Ca    8.9      29 Jul 2024 16:25    TPro  7.3  /  Alb  3.7  /  TBili  0.6  /  DBili  x   /  AST  15  /  ALT  12  /  AlkPhos  55  07-29    LIVER FUNCTIONS - ( 29 Jul 2024 16:25 )  Alb: 3.7 g/dL / Pro: 7.3 g/dL / ALK PHOS: 55 U/L / ALT: 12 U/L DA / AST: 15 U/L / GGT: x               PT/INR - ( 29 Jul 2024 16:25 )   PT: 10.6 sec;   INR: 0.93 ratio         PTT - ( 29 Jul 2024 16:25 )  PTT:32.5 sec    I&O's Summary    30 Jul 2024 07:01  -  31 Jul 2024 07:00  --------------------------------------------------------  IN: 430 mL / OUT: 0 mL / NET: 430 mL            CAPILLARY BLOOD GLUCOSE      RADIOLOGY & ADDITIONAL TESTS:

## 2024-07-31 NOTE — DISCHARGE NOTE PROVIDER - ATTENDING DISCHARGE PHYSICAL EXAMINATION:
I interviewed and examined patient on day of discharge with assistance of  ID # [id number]    GENERAL: NAD, well-developed  HEAD:  Atraumatic, Normocephalic  EYES: EOMI, PERRLA, conjunctiva and sclera clear  NECK: Supple, No JVD  CHEST/LUNG: Clear to auscultation bilaterally; No wheeze  HEART: Regular rate and rhythm; No murmurs, rubs, or gallops  ABDOMEN: Soft, Nontender, Nondistended; Bowel sounds present  EXTREMITIES:  2+ Peripheral Pulses, No clubbing, cyanosis, or edema  PSYCH: AAOx3  NEUROLOGY: non-focal; specifically no dysarthria or focal upper weakness  SKIN: No rashes or lesions

## 2024-07-31 NOTE — PROGRESS NOTE ADULT - PROBLEM SELECTOR PLAN 1
p/w RT sided weakness and slurred speech that started 7/29/2023 approx at 3pm  patient returned back to baseline while in the ED  NIHSS 0 in the ED   CXR: neg   Stroke protocol: negative for hemorrhage, stenosis, midline shift. Left frontal alex hole noted. Encephalomalacia/gliosis along the prior ventricular tract noted.  Passed dysphagia screen  Neuro consult (Dr. Reyes) this AM: TIA, possibly L MCA territory. ABCD2 score 4  - Started Aspirin 81mg, Atorvastatin 80mg QD, Clopidogrel 75mg QD  - MRI w/ and w/o contrast ordered  - Tele monitoring  - Vitals Q4hrs   - Neuro checks q4  - Fall risk precaution  - Aspiration risk   - Monitor BP - allow permissive htn x 24hr ( BP Goal:  <180/110)  - PT consulted   - f/u Lipid panel, A1c   - f/u TTE with bubble study  - f/u ECG p/w RT sided weakness and slurred speech that started 7/29/2023 approx at 3pm  patient returned back to baseline while in the ED  NIHSS 0 in the ED   CXR: neg   Stroke protocol: negative for hemorrhage, stenosis, midline shift. Left frontal alex hole noted. Encephalomalacia/gliosis along the prior ventricular tract noted.  Passed dysphagia screen  Neuro consult (Dr. Reyes) this AM: TIA, possibly L MCA territory. 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 Left frontal alex hole noted. Encephalomalacia/gliosis along the prior ventricular tract noted.  Passed dysphagia screen  Neuro consulted (Dr. Reyes) 7/30: TIA, possibly L MCA territory. 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 Left frontal alex hole noted. Encephalomalacia/gliosis along the prior ventricular tract noted.  Passed dysphagia screen  Neuro consulted (Dr. Reyes) 7/30: TIA, possibly L MCA territory. 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

## 2024-07-31 NOTE — EEG REPORT - NS EEG TEXT BOX
KEATON SANTANA [id number]     Study Date: 		07-31-24  Duration: 26 min  --------------------------------------------------------------------------------------------------  History:  CC/ HPI Patient is a 39y old  Male who presents with a chief complaint of TIA (31 Jul 2024 07:57)    MEDICATIONS  (STANDING):  aspirin enteric coated 81 milliGRAM(s) Oral daily  atorvastatin 80 milliGRAM(s) Oral at bedtime  clopidogrel Tablet 75 milliGRAM(s) Oral daily  enoxaparin Injectable 40 milliGRAM(s) SubCutaneous every 24 hours    --------------------------------------------------------------------------------------------------  Study Interpretation:    [[[Abbreviation Key:  PDR=alpha rhythm/posterior dominant rhythm. A-P=anterior posterior.  Amplitude: ‘very low’:<20; ‘low’:20-49; ‘medium’:; ‘high’:>150uV.  Persistence for periodic/rhythmic patterns (% of epoch) ‘rare’:<1%; ‘occasional’:1-10%; ‘frequent’:10-50%; ‘abundant’:50-90%; ‘continuous’:>90%.  Persistence for sporadic discharges: ‘rare’:<1/hr; ‘occasional’:1/min-1/hr; ‘frequent’:>1/min; ‘abundant’:>1/10 sec.  RPP=rhythmic and periodic patterns; GRDA=generalized rhythmic delta activity; FIRDA=frontal intermittent GRDA; LRDA=lateralized rhythmic delta activity; TIRDA=temporal intermittent rhythmic delta activity;  LPD=PLED=lateralized periodic discharges; GPD=generalized periodic discharges; BIPDs =bilateral independent periodic discharges; Mf=multifocal; SIRPDs=stimulus induced rhythmic, periodic, or ictal appearing discharges; BIRDs=brief potentially ictal rhythmic discharges >4 Hz, lasting .5-10s; PFA (paroxysmal bursts >13 Hz or =8 Hz <10s).  Modifiers: +F=with fast component; +S=with spike component; +R=with rhythmic component.  S-B=burst suppression pattern.  Max=maximal. N1-drowsy; N2-stage II sleep; N3-slow wave sleep. SSS/BETS=small sharp spikes/benign epileptiform transients of sleep. HV=hyperventilation; PS=photic stimulation]]]    Daily EEG Visual Analysis    FINDINGS:      Background:  Continuity: continuous  Symmetry: asymmetric  PDR: 9 Hz activity, with amplitude to 40 uV, that attenuated to eye opening.  Low amplitude frontal beta noted in wakefulness.  Reactivity: present  Voltage: normal, mostly 20-150uV  Anterior Posterior Gradient: present  Other background findings: none  Breach: absent    Background Slowing:  Generalized slowing: none was present.  Focal slowing: Intermittent left frontotemporal irregular delta/theta activity.     State Changes:   -Drowsiness noted with increased slowing, attenuation of fast activity, vertex transients.  -N2 sleep transients were not recorded.      Sporadic Epileptiform Discharges:    None    Rhythmic and Periodic Patterns (RPPs):  None     Electrographic and Electroclinical seizures:  None    Other Clinical Events:  None    Activation Procedures:   -Hyperventilation was not performed.    -Photic stimulation was performed and did not elicit any abnormalities.      Artifacts:  Intermittent myogenic and movement artifacts were noted.    ECG:  The heart rate on single channel ECG was predominantly between 60-70 BPM.    EEG Classification / Summary:  Abnormal  EEG in the awake / drowsy state(s).  Intermittent left frontotemporal irregular delta/theta activity.     Clinical Impression:  Focal cerebral dysfunction in the left frontotemporal region.   There were no epileptiform abnormalities recorded.      This is a preliminary fellow impression only pending attending review    Cholo Garcia MD - Epilepsy Fellow KEATON SANTANA [id number]     Study Date: 		07-31-24  Duration: 26 min  --------------------------------------------------------------------------------------------------  History:  CC/ HPI Patient is a 39y old  Male who presents with a chief complaint of TIA (31 Jul 2024 07:57)    MEDICATIONS  (STANDING):  aspirin enteric coated 81 milliGRAM(s) Oral daily  atorvastatin 80 milliGRAM(s) Oral at bedtime  clopidogrel Tablet 75 milliGRAM(s) Oral daily  enoxaparin Injectable 40 milliGRAM(s) SubCutaneous every 24 hours    --------------------------------------------------------------------------------------------------  Study Interpretation:    [[[Abbreviation Key:  PDR=alpha rhythm/posterior dominant rhythm. A-P=anterior posterior.  Amplitude: ‘very low’:<20; ‘low’:20-49; ‘medium’:; ‘high’:>150uV.  Persistence for periodic/rhythmic patterns (% of epoch) ‘rare’:<1%; ‘occasional’:1-10%; ‘frequent’:10-50%; ‘abundant’:50-90%; ‘continuous’:>90%.  Persistence for sporadic discharges: ‘rare’:<1/hr; ‘occasional’:1/min-1/hr; ‘frequent’:>1/min; ‘abundant’:>1/10 sec.  RPP=rhythmic and periodic patterns; GRDA=generalized rhythmic delta activity; FIRDA=frontal intermittent GRDA; LRDA=lateralized rhythmic delta activity; TIRDA=temporal intermittent rhythmic delta activity;  LPD=PLED=lateralized periodic discharges; GPD=generalized periodic discharges; BIPDs =bilateral independent periodic discharges; Mf=multifocal; SIRPDs=stimulus induced rhythmic, periodic, or ictal appearing discharges; BIRDs=brief potentially ictal rhythmic discharges >4 Hz, lasting .5-10s; PFA (paroxysmal bursts >13 Hz or =8 Hz <10s).  Modifiers: +F=with fast component; +S=with spike component; +R=with rhythmic component.  S-B=burst suppression pattern.  Max=maximal. N1-drowsy; N2-stage II sleep; N3-slow wave sleep. SSS/BETS=small sharp spikes/benign epileptiform transients of sleep. HV=hyperventilation; PS=photic stimulation]]]    Daily EEG Visual Analysis    FINDINGS:      Background:  Continuity: continuous  Symmetry: asymmetric  PDR: 9 Hz activity, with amplitude to 40 uV, that attenuated to eye opening.  Low amplitude frontal beta noted in wakefulness.  Reactivity: present  Voltage: normal, mostly 20-150uV  Anterior Posterior Gradient: present  Other background findings: none  Breach: absent    Background Slowing:  Generalized slowing: none was present.  Focal slowing: Intermittent left frontotemporal irregular delta/theta activity.     State Changes:   -Drowsiness noted with increased slowing, attenuation of fast activity, vertex transients.  -N2 sleep transients were not recorded.      Sporadic Epileptiform Discharges:    None    Rhythmic and Periodic Patterns (RPPs):  None     Electrographic and Electroclinical seizures:  None    Other Clinical Events:  None    Activation Procedures:   -Hyperventilation was not performed.    -Photic stimulation was performed and did not elicit any abnormalities.      Artifacts:  Intermittent myogenic and movement artifacts were noted.    ECG:  The heart rate on single channel ECG was predominantly between 60-70 BPM.    EEG Classification / Summary:  Abnormal  EEG in the awake / drowsy state(s).  Intermittent left frontotemporal irregular delta/theta activity.     Clinical Impression:  Focal cerebral dysfunction in the left frontotemporal region.   There were no epileptiform abnormalities recorded.        Cholo Garcia MD - Epilepsy Fellow    Manuel Faust MD  Neurology Attending Physician

## 2024-07-31 NOTE — DISCHARGE NOTE PROVIDER - PROVIDER TOKENS
FREE:[LAST:[Gabriela],FIRST:[Martita],PHONE:[phone]],FAX:[(   )    -],ADDRESS:[Please follow up with neuro-oncology after discharge (separate appointment) - Dr. Ochoa or Dr. Rosario [address] [place] [address] [phone] - please call to schedule appointment]] FREE:[LAST:[Gabriela],FIRST:[Martita],PHONE:[phone]],FAX:[(   )    -],ADDRESS:[Please follow up with neuro-oncology after discharge (separate appointment) - Dr. Ochoa or Dr. Rosario [address] [place] [address] [phone] - please call to schedule appointment]],PROVIDER:[TOKEN:[[id number]]]

## 2024-08-01 PROBLEM — Z00.00 ENCOUNTER FOR PREVENTIVE HEALTH EXAMINATION: Status: ACTIVE | Noted: 2024-08-01

## 2024-08-02 PROBLEM — C71.9 MALIGNANT NEOPLASM OF BRAIN, UNSPECIFIED: Chronic | Status: ACTIVE | Noted: 2024-07-29

## 2024-08-05 ENCOUNTER — APPOINTMENT (OUTPATIENT)
Dept: NEUROLOGY | Facility: CLINIC | Age: 39
End: 2024-08-05

## 2024-08-05 PROBLEM — Z82.49 FAMILY HISTORY OF HYPERTENSION: Status: ACTIVE | Noted: 2024-08-05

## 2024-08-05 PROBLEM — D49.6 BRAIN TUMOR: Status: RESOLVED | Noted: 2024-08-05 | Resolved: 2024-08-05

## 2024-08-05 PROBLEM — Z78.9 NON-SMOKER: Status: ACTIVE | Noted: 2024-08-05

## 2024-08-05 PROBLEM — I63.9 ISCHEMIC STROKE: Status: ACTIVE | Noted: 2024-08-05

## 2024-08-05 PROCEDURE — 99205 OFFICE O/P NEW HI 60 MIN: CPT

## 2024-08-05 PROCEDURE — T1013A: CUSTOM

## 2024-08-05 PROCEDURE — G2212 PROLONG OUTPT/OFFICE VIS: CPT

## 2024-08-05 PROCEDURE — G2211 COMPLEX E/M VISIT ADD ON: CPT | Mod: NC,1L

## 2024-08-05 NOTE — CONSULT LETTER
[Dear  ___] : Dear  [unfilled], [Consult Letter:] : I had the pleasure of evaluating your patient, [unfilled]. [Please see my note below.] : Please see my note below. [Consult Closing:] : Thank you very much for allowing me to participate in the care of this patient.  If you have any questions, please do not hesitate to contact me. [Sincerely,] : Sincerely, [FreeTextEntry2] : Edmundo Koenig MD [FreeTextEntry3] : Louann Coleman, FNP-BC

## 2024-08-05 NOTE — HISTORY OF PRESENT ILLNESS
[FreeTextEntry1] : He is a 39 year old right handed gentleman.  HPI from [facility] 7/30/24: He has a hx of brain tumor s/p radiation in [place] (2003, s/p biopsy, no chemo or resection, no longer following with doctors for it), at baseline walks independently, presented on 7/29 for R-sided weakness and expressive aphasia/confusion lasting ~1.5 hours. Pt was sitting at the table about to eat when he started feeling unwell, couldn't think of what he wanted to say, had difficulty moving RUE. Denies significant difficulty moving RLE but later says he needed help to walk. Denies facial droop. He was able to understand and remember what was happening around him and what others were saying. Symptoms worsened over 1 hour and improved after about 1.5 hours. He was able to stand again when EMS came. No abnormal movements reported. Pt returned to his baseline and feels well now. Pt and sister deny hx of seizures, staring episodes, febrile seizures, head injury. Never had a similar episode of speech difficulty or worsened weakness Hx of brain tumor: Pt states he was told it was malignant and that biopsy was done. He was told the brain was "rotten" and nothing could be done, so the tumor was not resected. Had radiation for 6 weeks. Did not have resection, chemo,  shunt, or other procedures. No other medical conditions. 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 Meds: none SH: Lives with sister. Spends [place]6 months [place] in the [place], rest of the time in [place] with family. [place] ~15 [place] this time. Has not been able to work since brain tumor diagnosis in 2003, but is able to walk independently/without assistive device.  Workup at [facility] includes: - MRI Brain 7/30/24: Chronic right frontal lobe cortical infarct. Chronic right caudate head lacunar infarction. 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 5 mm susceptibility artifact in the left frontal lobe on image 44 series 11 suggesting a small cavernoma. Similar findings in the bilateral temporal lobes and bilateral cerebellar hemispheres. No acute infarction. - CTA head and neck 7/29/24: No hemodynamically significant stenosis. - TTE 7/31/24: EF 67 %. Agitated saline injection reveals bubbles in the left heart, consistent with a pulmonary arteriovenous malformation.  8/5/24 He presents to the office today with his sister. They note that on 7/29/24, he had a 1 hour long episode of expressive aphasia and being unable to move his right arm. He is now back to his baseline and denies any new focal neurologic symptoms.   PCP Dr. Edmundo Koenig

## 2024-08-05 NOTE — REASON FOR VISIT
[[employer] ] : provided by [employer]   [Time Spent: ____ minutes] : Total time spent using  services: [unfilled] minutes. The patient's primary language is not English thus required  services. [Post Hospitalization] : a post hospitalization visit [FreeTextEntry1] : stroke [Interpreters_IDNumber] : [id number] [Interpreters_FullName] : Kierra [FreeTextEntry3] : Greenlandic

## 2024-08-05 NOTE — DISCUSSION/SUMMARY
[FreeTextEntry1] : 8/5/24 He has severely slowed fine finger movements on the right, a sequela of his previous biopsy/radiation therapy. He is otherwise neurologically intact.  To summarize, he has a history of  brain cancer  s/p biopsy with radiation 21 years ago with residual right sided weakness. He presented to [facility] 7/30/24 with right side weakness and aphasia for 1 hour, consistent with left hemispheric dysfunction; the etiology could be a TIA of unknown mechanism versus a seizure. 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  - He is taking Aspirin and Plavix X 3 weeks, followed by Aspirin monotherapy as per the [facility] protocol. - I have requested a 48 hour EEG. - He should consult with pulmonology for an opinion regarding whether the pulmonary AVM could have been responsible for his prior infarct and whether he may benefit from treatment. He has an appointment on 8/14/24.  - I have requested lower extremity Dopplers. - I recommend he have at least 1 month of cardiac monitoring to screen for occult AF. Referral to cardiology provided.  - He has a left thalamic lesion (possibly the same lesion he received radiation for previously). He should consult with neuro-oncology for further assessment/management of this finding. Referral provided.   He can follow up with a STROKE MD at the next available appointment. I hope he remains free of further serious trouble.

## 2024-08-05 NOTE — DISCUSSION/SUMMARY
[FreeTextEntry1] : 8/5/24 He has severely slowed fine finger movements on the right, a sequela of his previous biopsy/radiation therapy. He is otherwise neurologically intact.  To summarize, he has a history of  brain cancer  s/p biopsy with radiation 21 years ago with residual right sided weakness. He presented to [facility] 7/30/24 with right side weakness and aphasia for 1 hour, consistent with left hemispheric dysfunction; the etiology could be a TIA of unknown mechanism versus a seizure. 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  - He is taking Aspirin and Plavix X 3 weeks, followed by Aspirin monotherapy as per the [facility] protocol. - I have requested a 48 hour EEG. - He should consult with pulmonology for an opinion regarding whether the pulmonary AVM could have been responsible for his prior infarct and whether he may benefit from treatment. He has an appointment on 8/14/24.  - I have requested lower extremity Dopplers. - I recommend he have at least 1 month of cardiac monitoring to screen for occult AF. Referral to cardiology provided.  - He has a left thalamic lesion (possibly the same lesion he received radiation for previously). He should consult with neuro-oncology for further assessment/management of this finding. Referral provided.   He can follow up with a STROKE MD at the next available appointment. I hope he remains free of further serious trouble.

## 2024-08-05 NOTE — DISCUSSION/SUMMARY
[FreeTextEntry1] : 8/5/24 He has severely slowed fine finger movements on the right, a sequela of his previous biopsy/radiation therapy. He is otherwise neurologically intact.  To summarize, he has a history of  brain cancer  s/p biopsy with radiation 21 years ago with residual right sided weakness. He presented to [facility] 7/30/24 with right side weakness and aphasia for 1 hour, consistent with left hemispheric dysfunction; the etiology could be a TIA of unknown mechanism versus a seizure. 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  - He is taking Aspirin and Plavix X 3 weeks, followed by Aspirin monotherapy as per the [facility] protocol. - I have requested a 48 hour EEG. - He should consult with pulmonology for an opinion regarding whether the pulmonary AVM could have been responsible for his prior infarct and whether he may benefit from treatment. He has an appointment on 8/14/24.  - I have requested lower extremity Dopplers. - I recommend he have at least 1 month of cardiac monitoring to screen for occult AF. Referral to cardiology provided.  - He has a left thalamic lesion (possibly the same lesion he received radiation for previously). He should consult with neuro-oncology for further assessment/management of this finding. Referral provided.   He can follow up with a STROKE MD at the next available appointment. I hope he remains free of further serious trouble.

## 2024-08-05 NOTE — HISTORY OF PRESENT ILLNESS
[FreeTextEntry1] : He is a 39 year old right handed gentleman.  HPI from [facility] 7/30/24: He has a hx of brain tumor s/p radiation in [place] (2003, s/p biopsy, no chemo or resection, no longer following with doctors for it), at baseline walks independently, presented on 7/29 for R-sided weakness and expressive aphasia/confusion lasting ~1.5 hours. Pt was sitting at the table about to eat when he started feeling unwell, couldn't think of what he wanted to say, had difficulty moving RUE. Denies significant difficulty moving RLE but later says he needed help to walk. Denies facial droop. He was able to understand and remember what was happening around him and what others were saying. Symptoms worsened over 1 hour and improved after about 1.5 hours. He was able to stand again when EMS came. No abnormal movements reported. Pt returned to his baseline and feels well now. Pt and sister deny hx of seizures, staring episodes, febrile seizures, head injury. Never had a similar episode of speech difficulty or worsened weakness Hx of brain tumor: Pt states he was told it was malignant and that biopsy was done. He was told the brain was "rotten" and nothing could be done, so the tumor was not resected. Had radiation for 6 weeks. Did not have resection, chemo,  shunt, or other procedures. No other medical conditions. 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 Meds: none SH: Lives with sister. [place]6 [place], rest of the time in [place] with family. [place] ~[place] this time. Has not been able to work since brain tumor diagnosis in 2003, but is able to walk independently/without assistive device.  Workup at [facility] includes: - MRI Brain 7/30/24: Chronic right frontal lobe cortical infarct. Chronic right caudate head lacunar infarction. 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 5 mm susceptibility artifact in the left frontal lobe on image 44 series 11 suggesting a small cavernoma. Similar findings in the bilateral temporal lobes and bilateral cerebellar hemispheres. No acute infarction. - CTA head and neck 7/29/24: No hemodynamically significant stenosis. - TTE 7/31/24: EF 67 %. Agitated saline injection reveals bubbles in the left heart, consistent with a pulmonary arteriovenous malformation.  8/5/24 He presents to the office today with his sister. They note that on 7/29/24, he had a 1 hour long episode of expressive aphasia and being unable to move his right arm. He is now back to his baseline and denies any new focal neurologic symptoms.   PCP Dr. Edmundo Koenig

## 2024-08-05 NOTE — PHYSICAL EXAM
[General Appearance - Alert] : alert [General Appearance - In No Acute Distress] : in no acute distress [Oriented To Time, Place, And Person] : oriented to person, place, and time [Impaired Insight] : insight and judgment were intact [Affect] : the affect was normal [Sclera] : the sclera and conjunctiva were normal [Extraocular Movements] : extraocular movements were intact [Outer Ear] : the ears and nose were normal in appearance [Examination Of The Oral Cavity] : the lips and gums were normal [Neck Appearance] : the appearance of the neck was normal [] : no respiratory distress [Auscultation Breath Sounds / Voice Sounds] : lungs were clear to auscultation bilaterally [Heart Sounds] : normal S1 and S2 [Abnormal Walk] : normal gait [Nail Clubbing] : no clubbing  or cyanosis of the fingernails [Musculoskeletal - Swelling] : no joint swelling seen [Motor Tone] : muscle strength and tone were normal [FreeTextEntry1] : Neurologic examination:  Mental status:  The patient is alert, attentive, and oriented. Speech is clear and fluent with good comprehension.  Cranial nerves:  CN II: Visual fields were not tested. Pupils are equal.  CN III, IV, VI: At primary gaze, there is no eye deviation.EOMI. No ptosis  CN V: Facial sensation is intact bilaterally.  CN VII: Face is symmetric with normal eye closure and smile.  CN VII: Hearing is grossly intact.  CN IX, X: Palate elevates symmetrically. Phonation is normal.  CN XI: Head turning and shoulder shrug are intact  CN XII: Tongue is midline with normal movements and no atrophy.  Motor:  There is no pronator drift of out-stretched arms. Muscle bulk and tone are normal. Strength is full bilaterally.   Sensory:  Light touch intact in fingers and toes.  Coordination:  Fine finger movements are severely slowed on the right. There is no dysmetria on finger-to-nose.   Gait/Stance:  Gait and tandem gait are normal. Romberg is absent.

## 2024-08-05 NOTE — REASON FOR VISIT
[[employer] ] : provided by [employer]   [Time Spent: ____ minutes] : Total time spent using  services: [unfilled] minutes. The patient's primary language is not English thus required  services. [Post Hospitalization] : a post hospitalization visit [FreeTextEntry1] : stroke [Interpreters_IDNumber] : [id number] [Interpreters_FullName] : Kierra [FreeTextEntry3] : Indian

## 2024-08-05 NOTE — HISTORY OF PRESENT ILLNESS
[FreeTextEntry1] : He is a 39 year old right handed gentleman.  HPI from [facility] 7/30/24: He has a hx of brain tumor s/p radiation in [place] (2003, s/p biopsy, no chemo or resection, no longer following with doctors for it), at baseline walks independently, presented on 7/29 for R-sided weakness and expressive aphasia/confusion lasting ~1.5 hours. Pt was sitting at the table about to eat when he started feeling unwell, couldn't think of what he wanted to say, had difficulty moving RUE. Denies significant difficulty moving RLE but later says he needed help to walk. Denies facial droop. He was able to understand and remember what was happening around him and what others were saying. Symptoms worsened over 1 hour and improved after about 1.5 hours. He was able to stand again when EMS came. No abnormal movements reported. Pt returned to his baseline and feels well now. Pt and sister deny hx of seizures, staring episodes, febrile seizures, head injury. Never had a similar episode of speech difficulty or worsened weakness Hx of brain tumor: Pt states he was told it was malignant and that biopsy was done. He was told the brain was "rotten" and nothing could be done, so the tumor was not resected. Had radiation for 6 weeks. Did not have resection, chemo,  shunt, or other procedures. No other medical conditions. 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 Meds: none SH: Lives with sister. [place]6 [place], rest of the time in [place] with family. [place] this time. Has not been able to work since brain tumor diagnosis in 2003, but is able to walk independently/without assistive device.  Workup at [facility] includes: - MRI Brain 7/30/24: Chronic right frontal lobe cortical infarct. Chronic right caudate head lacunar infarction. 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 5 mm susceptibility artifact in the left frontal lobe on image 44 series 11 suggesting a small cavernoma. Similar findings in the bilateral temporal lobes and bilateral cerebellar hemispheres. No acute infarction. - CTA head and neck 7/29/24: No hemodynamically significant stenosis. - TTE 7/31/24: EF 67 %. Agitated saline injection reveals bubbles in the left heart, consistent with a pulmonary arteriovenous malformation.  8/5/24 He presents to the office today with his sister. They note that on 7/29/24, he had a 1 hour long episode of expressive aphasia and being unable to move his right arm. He is now back to his baseline and denies any new focal neurologic symptoms.   PCP Dr. Edmundo Koenig

## 2024-08-05 NOTE — REASON FOR VISIT
[[employer] ] : provided by [employer]   [Time Spent: ____ minutes] : Total time spent using  services: [unfilled] minutes. The patient's primary language is not English thus required  services. [Post Hospitalization] : a post hospitalization visit [FreeTextEntry1] : stroke [Interpreters_IDNumber] : [id number] [Interpreters_FullName] : Kierra [FreeTextEntry3] : Chinese

## 2024-08-14 ENCOUNTER — APPOINTMENT (OUTPATIENT)
Dept: PULMONOLOGY | Facility: CLINIC | Age: 39
End: 2024-08-14

## 2024-08-14 VITALS
BODY MASS INDEX: 25.92 KG/M2 | RESPIRATION RATE: 16 BRPM | OXYGEN SATURATION: 99 % | TEMPERATURE: 98.1 F | WEIGHT: 175 LBS | DIASTOLIC BLOOD PRESSURE: 87 MMHG | HEIGHT: 69 IN | SYSTOLIC BLOOD PRESSURE: 142 MMHG | HEART RATE: 61 BPM

## 2024-08-14 PROCEDURE — 99204 OFFICE O/P NEW MOD 45 MIN: CPT

## 2024-08-14 PROCEDURE — G2211 COMPLEX E/M VISIT ADD ON: CPT | Mod: NC,1L

## 2024-08-19 NOTE — PROCEDURE
[FreeTextEntry1] : 7/31/2024 [facility] TTE CONCLUSIONS:  1. Left ventricular systolic function is normal with an ejection fraction of 67 % by Cox's method of disks.  2. There is normal LV mass and concentric remodeling.  3. Normal right ventricular cavity size, with normal wall thickness, and normal right ventricular systolic function.  4. Agitated saline injection reveals bubbles in the left heart, consistent with a pulmonary arteriovenous malformation.    07/29/2024 XR CHEST PORTABLE URGENT 1V  No airspace consolidations or radiographic evidence of pulmonary nodules.. No pleural effusion or pneumothorax. HEART/VASCULAR: The heart size and mediastinum configuration are within  the limits of normal. IMPRESSION:  No radiographic evidence of active chest disease..   07/29/2024  [facility] CT ANGIO BRAIN STROKE PROTC IC / CT BRAIN PERFUSION MAPS / CT ANGIO NECK STROKE PROTCL IMPRESSION: No acute intracranial hemorrhage or acute territorial infarct. If acute  ischemia is a strong clinical concern, MRI exam is recommended for  further evaluation if there is no contraindication. Nonspecific hypodensity in the region of medial left thalamus/left  lateral third ventricle. Underlying cystic lesion not excluded. MRI of  thebrain with contrast recommended. Unremarkable CT perfusion No hemodynamically significant stenosis

## 2024-08-19 NOTE — PROCEDURE
[FreeTextEntry1] : 7/31/2024 Count includes the [facility] TTE CONCLUSIONS:  1. Left ventricular systolic function is normal with an ejection fraction of 67 % by Cox's method of disks.  2. There is normal LV mass and concentric remodeling.  3. Normal right ventricular cavity size, with normal wall thickness, and normal right ventricular systolic function.  4. Agitated saline injection reveals bubbles in the left heart, consistent with a pulmonary arteriovenous malformation.    07/29/2024 XR CHEST PORTABLE URGENT 1V  No airspace consolidations or radiographic evidence of pulmonary nodules.. No pleural effusion or pneumothorax. HEART/VASCULAR: The heart size and mediastinum configuration are within  the limits of normal. IMPRESSION:  No radiographic evidence of active chest disease..   07/29/2024  Count includes the [facility] CT ANGIO BRAIN STROKE PROTC IC / CT BRAIN PERFUSION MAPS / CT ANGIO NECK STROKE PROTCL IMPRESSION: No acute intracranial hemorrhage or acute territorial infarct. If acute  ischemia is a strong clinical concern, MRI exam is recommended for  further evaluation if there is no contraindication. Nonspecific hypodensity in the region of medial left thalamus/left  lateral third ventricle. Underlying cystic lesion not excluded. MRI of  thebrain with contrast recommended. Unremarkable CT perfusion No hemodynamically significant stenosis

## 2024-08-19 NOTE — REASON FOR VISIT
[Follow-Up - From Hospitalization] : a follow-up visit after a recent hospitalization [Family Member] : family member [Other: _____] : [unfilled] [TextBox_44] : concern for pulmonary AVM

## 2024-08-19 NOTE — HISTORY OF PRESENT ILLNESS
[Never] : never [TextBox_4] : Mr. SANTANA is a 39 year man, never smoker, with PMH Brain CA s/p biopsy with radiation 21 yrs ago with residual RT sided weakness, hospital stay recently for TIA who presents to Pulmonary clinic for f/u after hospitalization, c/f pulmonary AVM. 08/14/2024 During hospitalization underwent TTE with bubble study which demonstrated micro bubbles in L side after >5 cardiac cycles, suggestive of extracardiac shunt eg pulmonary AVM. After discharge pt has been feeling well. No active respiratory sx, no h/o pulmonary dz. Emigrated from . No nosebleeds. No telangiectasis. Possible family h/o "bleeding issues". No abdominal distension, h/o cirrhosis, RUQ tenderness.  At this time, he denies anginal/pleuritic CP, SOB/DAUGHERTY, coughing, wheezing, stridor, orthopnea, hemoptysis, LE edema, sick contacts, recent travel, history of frequent URIs/LRTIs, recent use of antibiotics/steroids, f/c/n/v. Denies platypnea.  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 Currently wearing a Holter monitor.

## 2024-08-19 NOTE — ASSESSMENT
[FreeTextEntry1] : Mr. SANTANA is a 39 year man, never smoker, with PMH Brain CA s/p biopsy with radiation 21 yrs ago with residual RT sided weakness, hospital stay recently for TIA who presents to Pulmonary clinic for f/u after hospitalization, c/f pulmonary AVM from positive bubble study suggestive of extracardiac shunt. No other e/o risk for HHT. No active respiratory sx and CXR unremarkable. Pulmonary AVM may be a rare but possible risk factor for cryptogenic emobolism/cva.   Plan: - CTA chest modified PE protocol to maximize evaluation for r/o pulmonary AVM - Encouraged exercise/ambulation as tolerated daily, deep breathing exercises - Return to clinic after CT chest to review results and next steps. Pt knows to call for any interval pulmonary issues or concerns

## 2024-08-19 NOTE — ADDENDUM
[FreeTextEntry1] : Time spent - Review of chart ( prior / today's documentation, labs/studies, VS trends) - Personal review of chest imaging - Medication reconciliation - Extensive pt interview and physical examination - Review of care plan and patient education

## 2024-09-03 ENCOUNTER — APPOINTMENT (OUTPATIENT)
Dept: CT IMAGING | Facility: CLINIC | Age: 39
End: 2024-09-03

## 2024-09-03 ENCOUNTER — APPOINTMENT (OUTPATIENT)
Dept: ULTRASOUND IMAGING | Facility: CLINIC | Age: 39
End: 2024-09-03

## 2024-09-20 ENCOUNTER — APPOINTMENT (OUTPATIENT)
Dept: NEUROLOGY | Facility: CLINIC | Age: 39
End: 2024-09-20

## 2024-09-23 ENCOUNTER — NON-APPOINTMENT (OUTPATIENT)
Age: 39
End: 2024-09-23

## 2024-10-01 ENCOUNTER — APPOINTMENT (OUTPATIENT)
Dept: NEUROLOGY | Facility: CLINIC | Age: 39
End: 2024-10-01

## 2024-10-14 NOTE — ED PROVIDER NOTE - NS SC NIH INTUBATED_GEN_A_CORE
Health Call Center    Phone Message    May a detailed message be left on voicemail: yes     Reason for Call: Patient had stroke last week and admitted to [facility] and release this past Friday.    Patient has a referral for Hospital Stroke follow up, but patient wants to see Dr. Post.  Patient requests a call back ASA    Action Taken: [facility]    Travel Screening: Not Applicable     Date of Service:                                                                    
Scheduled for tomorrow, 10/15 at 7AM.   
No

## 2025-04-08 NOTE — H&P ADULT - NSHPREVIEWOFSYSTEMS_GEN_ALL_CORE
CEA 13.3  - Palliative managing pain. Anti-emetics PRN, effective  - On coumadin, [facility] managing  - Weight 95 again (102 on initiation). On marinol. Nutrition following  - RTC in 2 weeks    Jermaine Campos MD   Electronically signed 4/8/2025 at 9:21 AM  
REVIEW OF SYSTEMS:  CONSTITUTIONAL: No fevers or chills  EYES: No conjunctiva redness, No eye discharge  ENT: No nasal congestion, No sore throat, No ear pain,   NECK: No pain or stiffness  RESPIRATORY: No cough, SOB,  wheezing, hemoptysis  CARDIOVASCULAR: No chest pain or  palpitations  GASTROINTESTINAL: No abdominal pain. No nausea, vomiting, diarrhea or constipation.  GENITOURINARY: No dysuria, frequency or hematuria  NEUROLOGICAL: + Slurred speech, RT sided weakness, No headache,  SKIN: No itching, rashes,   All other review of systems is negative unless indicated above.